# Patient Record
Sex: FEMALE | Race: ASIAN | NOT HISPANIC OR LATINO | URBAN - METROPOLITAN AREA
[De-identification: names, ages, dates, MRNs, and addresses within clinical notes are randomized per-mention and may not be internally consistent; named-entity substitution may affect disease eponyms.]

---

## 2019-11-02 ENCOUNTER — INPATIENT (INPATIENT)
Facility: HOSPITAL | Age: 74
LOS: 1 days | Discharge: HOME | End: 2019-11-04
Attending: HOSPITALIST | Admitting: HOSPITALIST
Payer: MEDICARE

## 2019-11-02 VITALS
SYSTOLIC BLOOD PRESSURE: 160 MMHG | DIASTOLIC BLOOD PRESSURE: 86 MMHG | RESPIRATION RATE: 16 BRPM | TEMPERATURE: 99 F | HEART RATE: 118 BPM | OXYGEN SATURATION: 98 %

## 2019-11-02 DIAGNOSIS — E78.5 HYPERLIPIDEMIA, UNSPECIFIED: ICD-10-CM

## 2019-11-02 DIAGNOSIS — Z02.9 ENCOUNTER FOR ADMINISTRATIVE EXAMINATIONS, UNSPECIFIED: ICD-10-CM

## 2019-11-02 DIAGNOSIS — J18.9 PNEUMONIA, UNSPECIFIED ORGANISM: ICD-10-CM

## 2019-11-02 DIAGNOSIS — M81.0 AGE-RELATED OSTEOPOROSIS WITHOUT CURRENT PATHOLOGICAL FRACTURE: ICD-10-CM

## 2019-11-02 DIAGNOSIS — Z29.9 ENCOUNTER FOR PROPHYLACTIC MEASURES, UNSPECIFIED: ICD-10-CM

## 2019-11-02 DIAGNOSIS — E87.1 HYPO-OSMOLALITY AND HYPONATREMIA: ICD-10-CM

## 2019-11-02 DIAGNOSIS — A41.9 SEPSIS, UNSPECIFIED ORGANISM: ICD-10-CM

## 2019-11-02 DIAGNOSIS — I10 ESSENTIAL (PRIMARY) HYPERTENSION: ICD-10-CM

## 2019-11-02 DIAGNOSIS — E11.65 TYPE 2 DIABETES MELLITUS WITH HYPERGLYCEMIA: ICD-10-CM

## 2019-11-02 LAB
ALBUMIN SERPL ELPH-MCNC: 3.9 G/DL — SIGNIFICANT CHANGE UP (ref 3.3–5)
ALP SERPL-CCNC: 58 U/L — SIGNIFICANT CHANGE UP (ref 40–120)
ALT FLD-CCNC: 16 U/L — SIGNIFICANT CHANGE UP (ref 4–33)
ANION GAP SERPL CALC-SCNC: 14 MMO/L — SIGNIFICANT CHANGE UP (ref 7–14)
APPEARANCE UR: CLEAR — SIGNIFICANT CHANGE UP
AST SERPL-CCNC: 19 U/L — SIGNIFICANT CHANGE UP (ref 4–32)
B PERT DNA SPEC QL NAA+PROBE: NOT DETECTED — SIGNIFICANT CHANGE UP
BACTERIA # UR AUTO: NEGATIVE — SIGNIFICANT CHANGE UP
BASE EXCESS BLDV CALC-SCNC: 0.4 MMOL/L — SIGNIFICANT CHANGE UP
BASE EXCESS BLDV CALC-SCNC: 1.2 MMOL/L — SIGNIFICANT CHANGE UP
BASOPHILS # BLD AUTO: 0.02 K/UL — SIGNIFICANT CHANGE UP (ref 0–0.2)
BASOPHILS NFR BLD AUTO: 0.2 % — SIGNIFICANT CHANGE UP (ref 0–2)
BILIRUB SERPL-MCNC: 0.4 MG/DL — SIGNIFICANT CHANGE UP (ref 0.2–1.2)
BILIRUB UR-MCNC: NEGATIVE — SIGNIFICANT CHANGE UP
BLOOD GAS VENOUS - CREATININE: 0.89 MG/DL — SIGNIFICANT CHANGE UP (ref 0.5–1.3)
BLOOD GAS VENOUS - CREATININE: 0.99 MG/DL — SIGNIFICANT CHANGE UP (ref 0.5–1.3)
BLOOD UR QL VISUAL: SIGNIFICANT CHANGE UP
BUN SERPL-MCNC: 21 MG/DL — SIGNIFICANT CHANGE UP (ref 7–23)
C PNEUM DNA SPEC QL NAA+PROBE: NOT DETECTED — SIGNIFICANT CHANGE UP
CALCIUM SERPL-MCNC: 9.6 MG/DL — SIGNIFICANT CHANGE UP (ref 8.4–10.5)
CHLORIDE BLDV-SCNC: 102 MMOL/L — SIGNIFICANT CHANGE UP (ref 96–108)
CHLORIDE BLDV-SCNC: 98 MMOL/L — SIGNIFICANT CHANGE UP (ref 96–108)
CHLORIDE SERPL-SCNC: 92 MMOL/L — LOW (ref 98–107)
CO2 SERPL-SCNC: 23 MMOL/L — SIGNIFICANT CHANGE UP (ref 22–31)
COLOR SPEC: YELLOW — SIGNIFICANT CHANGE UP
CREAT SERPL-MCNC: 1.03 MG/DL — SIGNIFICANT CHANGE UP (ref 0.5–1.3)
EOSINOPHIL # BLD AUTO: 0.22 K/UL — SIGNIFICANT CHANGE UP (ref 0–0.5)
EOSINOPHIL NFR BLD AUTO: 1.8 % — SIGNIFICANT CHANGE UP (ref 0–6)
FLUAV H1 2009 PAND RNA SPEC QL NAA+PROBE: NOT DETECTED — SIGNIFICANT CHANGE UP
FLUAV H1 RNA SPEC QL NAA+PROBE: NOT DETECTED — SIGNIFICANT CHANGE UP
FLUAV H3 RNA SPEC QL NAA+PROBE: NOT DETECTED — SIGNIFICANT CHANGE UP
FLUAV SUBTYP SPEC NAA+PROBE: NOT DETECTED — SIGNIFICANT CHANGE UP
FLUBV RNA SPEC QL NAA+PROBE: NOT DETECTED — SIGNIFICANT CHANGE UP
GAS PNL BLDV: 128 MMOL/L — LOW (ref 136–146)
GAS PNL BLDV: 129 MMOL/L — LOW (ref 136–146)
GLUCOSE BLDV-MCNC: 300 MG/DL — HIGH (ref 70–99)
GLUCOSE BLDV-MCNC: 317 MG/DL — HIGH (ref 70–99)
GLUCOSE SERPL-MCNC: 322 MG/DL — HIGH (ref 70–99)
GLUCOSE UR-MCNC: >1000 — HIGH
HADV DNA SPEC QL NAA+PROBE: NOT DETECTED — SIGNIFICANT CHANGE UP
HCO3 BLDV-SCNC: 24 MMOL/L — SIGNIFICANT CHANGE UP (ref 20–27)
HCO3 BLDV-SCNC: 25 MMOL/L — SIGNIFICANT CHANGE UP (ref 20–27)
HCOV PNL SPEC NAA+PROBE: SIGNIFICANT CHANGE UP
HCT VFR BLD CALC: 37 % — SIGNIFICANT CHANGE UP (ref 34.5–45)
HCT VFR BLDV CALC: 36.9 % — SIGNIFICANT CHANGE UP (ref 34.5–45)
HCT VFR BLDV CALC: 42.4 % — SIGNIFICANT CHANGE UP (ref 34.5–45)
HGB BLD-MCNC: 12.5 G/DL — SIGNIFICANT CHANGE UP (ref 11.5–15.5)
HGB BLDV-MCNC: 12 G/DL — SIGNIFICANT CHANGE UP (ref 11.5–15.5)
HGB BLDV-MCNC: 13.8 G/DL — SIGNIFICANT CHANGE UP (ref 11.5–15.5)
HMPV RNA SPEC QL NAA+PROBE: NOT DETECTED — SIGNIFICANT CHANGE UP
HPIV1 RNA SPEC QL NAA+PROBE: NOT DETECTED — SIGNIFICANT CHANGE UP
HPIV2 RNA SPEC QL NAA+PROBE: NOT DETECTED — SIGNIFICANT CHANGE UP
HPIV3 RNA SPEC QL NAA+PROBE: NOT DETECTED — SIGNIFICANT CHANGE UP
HPIV4 RNA SPEC QL NAA+PROBE: NOT DETECTED — SIGNIFICANT CHANGE UP
HYALINE CASTS # UR AUTO: NEGATIVE — SIGNIFICANT CHANGE UP
IMM GRANULOCYTES NFR BLD AUTO: 0.5 % — SIGNIFICANT CHANGE UP (ref 0–1.5)
KETONES UR-MCNC: SIGNIFICANT CHANGE UP
LACTATE BLDV-MCNC: 2.1 MMOL/L — HIGH (ref 0.5–2)
LACTATE BLDV-MCNC: 3.2 MMOL/L — HIGH (ref 0.5–2)
LEUKOCYTE ESTERASE UR-ACNC: SIGNIFICANT CHANGE UP
LYMPHOCYTES # BLD AUTO: 0.89 K/UL — LOW (ref 1–3.3)
LYMPHOCYTES # BLD AUTO: 7.4 % — LOW (ref 13–44)
MCHC RBC-ENTMCNC: 30.2 PG — SIGNIFICANT CHANGE UP (ref 27–34)
MCHC RBC-ENTMCNC: 33.8 % — SIGNIFICANT CHANGE UP (ref 32–36)
MCV RBC AUTO: 89.4 FL — SIGNIFICANT CHANGE UP (ref 80–100)
MONOCYTES # BLD AUTO: 0.39 K/UL — SIGNIFICANT CHANGE UP (ref 0–0.9)
MONOCYTES NFR BLD AUTO: 3.3 % — SIGNIFICANT CHANGE UP (ref 2–14)
NEUTROPHILS # BLD AUTO: 10.42 K/UL — HIGH (ref 1.8–7.4)
NEUTROPHILS NFR BLD AUTO: 86.8 % — HIGH (ref 43–77)
NITRITE UR-MCNC: NEGATIVE — SIGNIFICANT CHANGE UP
NRBC # FLD: 0 K/UL — SIGNIFICANT CHANGE UP (ref 0–0)
PCO2 BLDV: 39 MMHG — LOW (ref 41–51)
PCO2 BLDV: 42 MMHG — SIGNIFICANT CHANGE UP (ref 41–51)
PH BLDV: 7.39 PH — SIGNIFICANT CHANGE UP (ref 7.32–7.43)
PH BLDV: 7.43 PH — SIGNIFICANT CHANGE UP (ref 7.32–7.43)
PH UR: 6 — SIGNIFICANT CHANGE UP (ref 5–8)
PLATELET # BLD AUTO: 159 K/UL — SIGNIFICANT CHANGE UP (ref 150–400)
PMV BLD: 10.6 FL — SIGNIFICANT CHANGE UP (ref 7–13)
PO2 BLDV: 36 MMHG — SIGNIFICANT CHANGE UP (ref 35–40)
PO2 BLDV: 41 MMHG — HIGH (ref 35–40)
POTASSIUM BLDV-SCNC: 3.9 MMOL/L — SIGNIFICANT CHANGE UP (ref 3.4–4.5)
POTASSIUM BLDV-SCNC: 4.1 MMOL/L — SIGNIFICANT CHANGE UP (ref 3.4–4.5)
POTASSIUM SERPL-MCNC: 4.3 MMOL/L — SIGNIFICANT CHANGE UP (ref 3.5–5.3)
POTASSIUM SERPL-SCNC: 4.3 MMOL/L — SIGNIFICANT CHANGE UP (ref 3.5–5.3)
PROT SERPL-MCNC: 7.5 G/DL — SIGNIFICANT CHANGE UP (ref 6–8.3)
PROT UR-MCNC: 100 — HIGH
RBC # BLD: 4.14 M/UL — SIGNIFICANT CHANGE UP (ref 3.8–5.2)
RBC # FLD: 12.6 % — SIGNIFICANT CHANGE UP (ref 10.3–14.5)
RBC CASTS # UR COMP ASSIST: HIGH (ref 0–?)
RSV RNA SPEC QL NAA+PROBE: NOT DETECTED — SIGNIFICANT CHANGE UP
RV+EV RNA SPEC QL NAA+PROBE: NOT DETECTED — SIGNIFICANT CHANGE UP
SAO2 % BLDV: 64.8 % — SIGNIFICANT CHANGE UP (ref 60–85)
SAO2 % BLDV: 75.5 % — SIGNIFICANT CHANGE UP (ref 60–85)
SODIUM SERPL-SCNC: 129 MMOL/L — LOW (ref 135–145)
SP GR SPEC: 1.03 — SIGNIFICANT CHANGE UP (ref 1–1.04)
SQUAMOUS # UR AUTO: SIGNIFICANT CHANGE UP
TROPONIN T, HIGH SENSITIVITY: 22 NG/L — SIGNIFICANT CHANGE UP (ref ?–14)
TSH SERPL-MCNC: 1.72 UIU/ML — SIGNIFICANT CHANGE UP (ref 0.27–4.2)
UROBILINOGEN FLD QL: NORMAL — SIGNIFICANT CHANGE UP
WBC # BLD: 12 K/UL — HIGH (ref 3.8–10.5)
WBC # FLD AUTO: 12 K/UL — HIGH (ref 3.8–10.5)
WBC UR QL: HIGH (ref 0–?)

## 2019-11-02 PROCEDURE — 71045 X-RAY EXAM CHEST 1 VIEW: CPT | Mod: 26

## 2019-11-02 RX ORDER — CEFTRIAXONE 500 MG/1
1000 INJECTION, POWDER, FOR SOLUTION INTRAMUSCULAR; INTRAVENOUS EVERY 24 HOURS
Refills: 0 | Status: DISCONTINUED | OUTPATIENT
Start: 2019-11-03 | End: 2019-11-03

## 2019-11-02 RX ORDER — INSULIN LISPRO 100/ML
VIAL (ML) SUBCUTANEOUS AT BEDTIME
Refills: 0 | Status: DISCONTINUED | OUTPATIENT
Start: 2019-11-02 | End: 2019-11-04

## 2019-11-02 RX ORDER — SODIUM CHLORIDE 9 MG/ML
1000 INJECTION, SOLUTION INTRAVENOUS
Refills: 0 | Status: DISCONTINUED | OUTPATIENT
Start: 2019-11-02 | End: 2019-11-04

## 2019-11-02 RX ORDER — CHOLECALCIFEROL (VITAMIN D3) 125 MCG
1 CAPSULE ORAL
Qty: 0 | Refills: 0 | DISCHARGE

## 2019-11-02 RX ORDER — RALOXIFENE HYDROCHLORIDE 60 MG/1
60 TABLET, COATED ORAL DAILY
Refills: 0 | Status: DISCONTINUED | OUTPATIENT
Start: 2019-11-02 | End: 2019-11-04

## 2019-11-02 RX ORDER — CHOLECALCIFEROL (VITAMIN D3) 125 MCG
1000 CAPSULE ORAL DAILY
Refills: 0 | Status: DISCONTINUED | OUTPATIENT
Start: 2019-11-02 | End: 2019-11-04

## 2019-11-02 RX ORDER — GLUCAGON INJECTION, SOLUTION 0.5 MG/.1ML
1 INJECTION, SOLUTION SUBCUTANEOUS ONCE
Refills: 0 | Status: DISCONTINUED | OUTPATIENT
Start: 2019-11-02 | End: 2019-11-04

## 2019-11-02 RX ORDER — SODIUM CHLORIDE 9 MG/ML
1000 INJECTION INTRAMUSCULAR; INTRAVENOUS; SUBCUTANEOUS ONCE
Refills: 0 | Status: COMPLETED | OUTPATIENT
Start: 2019-11-02 | End: 2019-11-02

## 2019-11-02 RX ORDER — INSULIN LISPRO 100/ML
VIAL (ML) SUBCUTANEOUS
Refills: 0 | Status: DISCONTINUED | OUTPATIENT
Start: 2019-11-02 | End: 2019-11-04

## 2019-11-02 RX ORDER — AZITHROMYCIN 500 MG/1
500 TABLET, FILM COATED ORAL EVERY 24 HOURS
Refills: 0 | Status: DISCONTINUED | OUTPATIENT
Start: 2019-11-03 | End: 2019-11-03

## 2019-11-02 RX ORDER — SITAGLIPTIN AND METFORMIN HYDROCHLORIDE 500; 50 MG/1; MG/1
1 TABLET, FILM COATED ORAL
Qty: 0 | Refills: 0 | DISCHARGE

## 2019-11-02 RX ORDER — DEXTROSE 50 % IN WATER 50 %
25 SYRINGE (ML) INTRAVENOUS ONCE
Refills: 0 | Status: DISCONTINUED | OUTPATIENT
Start: 2019-11-02 | End: 2019-11-04

## 2019-11-02 RX ORDER — DEXTROSE 50 % IN WATER 50 %
12.5 SYRINGE (ML) INTRAVENOUS ONCE
Refills: 0 | Status: DISCONTINUED | OUTPATIENT
Start: 2019-11-02 | End: 2019-11-04

## 2019-11-02 RX ORDER — CEFTRIAXONE 500 MG/1
1000 INJECTION, POWDER, FOR SOLUTION INTRAMUSCULAR; INTRAVENOUS ONCE
Refills: 0 | Status: COMPLETED | OUTPATIENT
Start: 2019-11-02 | End: 2019-11-02

## 2019-11-02 RX ORDER — ASPIRIN/CALCIUM CARB/MAGNESIUM 324 MG
81 TABLET ORAL DAILY
Refills: 0 | Status: DISCONTINUED | OUTPATIENT
Start: 2019-11-02 | End: 2019-11-04

## 2019-11-02 RX ORDER — ATORVASTATIN CALCIUM 80 MG/1
40 TABLET, FILM COATED ORAL AT BEDTIME
Refills: 0 | Status: DISCONTINUED | OUTPATIENT
Start: 2019-11-02 | End: 2019-11-04

## 2019-11-02 RX ORDER — LEVOTHYROXINE SODIUM 125 MCG
1 TABLET ORAL
Qty: 0 | Refills: 0 | DISCHARGE

## 2019-11-02 RX ORDER — IPRATROPIUM/ALBUTEROL SULFATE 18-103MCG
3 AEROSOL WITH ADAPTER (GRAM) INHALATION ONCE
Refills: 0 | Status: COMPLETED | OUTPATIENT
Start: 2019-11-02 | End: 2019-11-02

## 2019-11-02 RX ORDER — LEVOTHYROXINE SODIUM 125 MCG
25 TABLET ORAL DAILY
Refills: 0 | Status: DISCONTINUED | OUTPATIENT
Start: 2019-11-02 | End: 2019-11-04

## 2019-11-02 RX ORDER — AZITHROMYCIN 500 MG/1
500 TABLET, FILM COATED ORAL ONCE
Refills: 0 | Status: COMPLETED | OUTPATIENT
Start: 2019-11-02 | End: 2019-11-02

## 2019-11-02 RX ORDER — ASPIRIN/CALCIUM CARB/MAGNESIUM 324 MG
1 TABLET ORAL
Qty: 0 | Refills: 0 | DISCHARGE

## 2019-11-02 RX ORDER — RALOXIFENE HYDROCHLORIDE 60 MG/1
1 TABLET, COATED ORAL
Qty: 0 | Refills: 0 | DISCHARGE

## 2019-11-02 RX ORDER — LOSARTAN POTASSIUM 100 MG/1
100 TABLET, FILM COATED ORAL DAILY
Refills: 0 | Status: DISCONTINUED | OUTPATIENT
Start: 2019-11-03 | End: 2019-11-04

## 2019-11-02 RX ORDER — SODIUM CHLORIDE 9 MG/ML
1000 INJECTION INTRAMUSCULAR; INTRAVENOUS; SUBCUTANEOUS
Refills: 0 | Status: DISCONTINUED | OUTPATIENT
Start: 2019-11-02 | End: 2019-11-04

## 2019-11-02 RX ORDER — DEXTROSE 50 % IN WATER 50 %
15 SYRINGE (ML) INTRAVENOUS ONCE
Refills: 0 | Status: DISCONTINUED | OUTPATIENT
Start: 2019-11-02 | End: 2019-11-04

## 2019-11-02 RX ADMIN — SODIUM CHLORIDE 75 MILLILITER(S): 9 INJECTION INTRAMUSCULAR; INTRAVENOUS; SUBCUTANEOUS at 23:41

## 2019-11-02 RX ADMIN — SODIUM CHLORIDE 1000 MILLILITER(S): 9 INJECTION INTRAMUSCULAR; INTRAVENOUS; SUBCUTANEOUS at 20:40

## 2019-11-02 RX ADMIN — CEFTRIAXONE 1000 MILLIGRAM(S): 500 INJECTION, POWDER, FOR SOLUTION INTRAMUSCULAR; INTRAVENOUS at 20:10

## 2019-11-02 RX ADMIN — Medication 3 MILLILITER(S): at 23:10

## 2019-11-02 RX ADMIN — SODIUM CHLORIDE 1000 MILLILITER(S): 9 INJECTION INTRAMUSCULAR; INTRAVENOUS; SUBCUTANEOUS at 19:23

## 2019-11-02 RX ADMIN — AZITHROMYCIN 255 MILLIGRAM(S): 500 TABLET, FILM COATED ORAL at 20:03

## 2019-11-02 RX ADMIN — AZITHROMYCIN 500 MILLIGRAM(S): 500 TABLET, FILM COATED ORAL at 21:05

## 2019-11-02 RX ADMIN — CEFTRIAXONE 100 MILLIGRAM(S): 500 INJECTION, POWDER, FOR SOLUTION INTRAMUSCULAR; INTRAVENOUS at 19:23

## 2019-11-02 NOTE — ED PROVIDER NOTE - CARE PLAN
Principal Discharge DX:	Pneumonia  Secondary Diagnosis:	Chest pain Principal Discharge DX:	Pneumonia  Secondary Diagnosis:	Chest pain  Secondary Diagnosis:	UTI (urinary tract infection)  Secondary Diagnosis:	Sepsis

## 2019-11-02 NOTE — H&P ADULT - PROBLEM SELECTOR PLAN 9
1.  Name of PCP: Margarita Mcgee   2.  PCP Contacted on Admission: [ x] Y    [ ] N    3.  PCP contacted at Discharge: [ ] Y    [ ] N    [ ] N/A  4.  Post-Discharge Appointment Date and Location:  5.  Summary of Handoff given to PCP:]    Latosha Christian PGY-3  Internal medicine Night Admit  Pager [phone] 1.  Name of PCP: Margarita Mcgee   2.  PCP Contacted on Admission: [ ] Y    [ x ] N  admitted overnight  3.  PCP contacted at Discharge: [ ] Y    [ ] N    [ ] N/A  4.  Post-Discharge Appointment Date and Location:  5.  Summary of Handoff given to PCP:]    Latosha Christian PGY-3  Internal medicine Night Admit  Pager [phone]

## 2019-11-02 NOTE — H&P ADULT - PROBLEM SELECTOR PLAN 4
- T2DM not on home insulin, unclear recent A1C  - Holding home diabetic medication including Glyburide, Metformin and Sitagliptin   - Continue with ISS for now, goal -180 while in the hospital   - Check A1C  - Patient is of advanced age, would benefit from discontinuation of Sulfonylurea to prevent hypoglycemia at discharge

## 2019-11-02 NOTE — H&P ADULT - PROBLEM SELECTOR PLAN 8
- DVT prophylaxis: IMPROVE score 1 (age), Lovenox subQ for prophylaxis  - GI prophylaxis: not indicated  - Diet: DASH/CC diet - DVT prophylaxis: IMPROVE score 1 (age), heparin subQ  - GI prophylaxis: not indicated  - Diet: DASH/CC diet

## 2019-11-02 NOTE — H&P ADULT - PROBLEM SELECTOR PLAN 1
- Patient presented with fever, tachycardia, leukocytosis and tachypnea meeting all SIRS criteria likely source being pneumonia based on clinical symptoms   - Lactate improved with IVF from 3.2--> 2.1  - Continue empirically with Ceftriaxone and Azithromycin for treatment for CAP while pending blood cultures x2 and urine culture   - Continue with gentle IVF hydration

## 2019-11-02 NOTE — H&P ADULT - PROBLEM SELECTOR PLAN 5
-160/80-90  - Holding home HCTZ in the setting of hyponatremia  - Continue with Losartan 100 mg with holding parameters as home equivalent of Olmesartan  - Monitor BP

## 2019-11-02 NOTE — ED PROVIDER NOTE - ATTENDING CONTRIBUTION TO CARE
I performed a face to face history and physical exam of the patient and discussed their management with the resident. I reviewed the resident's note and agree with the documented findings and plan of care. 74 y/o female with DM, HTN, hypothyroid, p/w weakness, decrease PO intake, fever, cough, pleuritic chest pain x 3 days, progressively worsening today with 3 episodes of vomiting, 2 episodes of nonbloody diarrhea yesterday, pt just finished 1 week dose of nitrofurantoin for UTI, mild periumbilical pain, no dysuria, no CVAT, no rash, no sick contacts, on exam pt with significant pleuritic chest pain, lungs CTA, abd soft nt/nd, axox3, 1)CBC, CMP, Blood cx x 2, u/a, urine culture, vbg 2)cxr, EKG 3)IVF 4)IV antibiotics 5)admit I performed a face to face history and physical exam of the patient and discussed their management with the PA. I reviewed the PA's note and agree with the documented findings and plan of care. 72 y/o female with DM, HTN, hypothyroid, p/w weakness, decrease PO intake, fever, cough, pleuritic chest pain x 3 days, progressively worsening today with 3 episodes of vomiting, 2 episodes of nonbloody diarrhea yesterday, pt just finished 1 week dose of nitrofurantoin for UTI, mild periumbilical pain, no dysuria, no CVAT, no rash, no sick contacts, on exam pt with significant pleuritic chest pain, lungs CTA, abd soft nt/nd, axox3, 1)CBC, CMP, Blood cx x 2, u/a, urine culture, vbg 2)cxr, EKG 3)IVF 4)IV antibiotics 5)admit

## 2019-11-02 NOTE — ED PROVIDER NOTE - PMH
Essential hypertension    Hammertoe, right  3rd toe  Hypothyroidism    Osteoporosis    Type 2 diabetes mellitus without complication

## 2019-11-02 NOTE — ED ADULT TRIAGE NOTE - CHIEF COMPLAINT QUOTE
Pt c/o weakness, dry  cough ,  intermittent chest pain, dizziness, high blood sugar, N/V/D, poor appetite , and  fever since Thursday, worse today.  pt took tylenol 1g at 5pm

## 2019-11-02 NOTE — H&P ADULT - NSHPLABSRESULTS_GEN_ALL_CORE
LABS:                        12.5   12.00 )-----------( 159      ( 2019 18:40 )             37.0     Hgb Trend: 12.5<--  11-02    129<L>  |  92<L>  |  21  ----------------------------<  322<H>  4.3   |  23  |  1.03    Ca    9.6      2019 18:40    TPro  7.5  /  Alb  3.9  /  TBili  0.4  /  DBili  x   /  AST  19  /  ALT  16  /  AlkPhos  58      Creatinine Trend: 1.03<--    Urinalysis Basic - ( 2019 18:40 )    Color: YELLOW / Appearance: CLEAR / S.030 / pH: 6.0  Gluc: >1000 / Ketone: SMALL  / Bili: NEGATIVE / Urobili: NORMAL   Blood: SMALL / Protein: 100 / Nitrite: NEGATIVE   Leuk Esterase: SMALL / RBC: 6-10 / WBC 11-25   Sq Epi: FEW / Non Sq Epi: x / Bacteria: NEGATIVE        Venous Blood Gas:   @ 20:46  7.39/42/36/24/64.8  VBG Lactate: 2.1  Venous Blood Gas:   @ 18:40  7.43/39/41/25/75.5  VBG Lactate: 3.2    CXR reviewed which did no reveal any focal consolidation, no pleural effusion LABS:                        12.5   12.00 )-----------( 159      ( 2019 18:40 )             37.0     Hgb Trend: 12.5<--  11-02    129<L>  |  92<L>  |  21  ----------------------------<  322<H>  4.3   |  23  |  1.03    Ca    9.6      2019 18:40    TPro  7.5  /  Alb  3.9  /  TBili  0.4  /  DBili  x   /  AST  19  /  ALT  16  /  AlkPhos  58      Creatinine Trend: 1.03<--    Urinalysis Basic - ( 2019 18:40 )    Color: YELLOW / Appearance: CLEAR / S.030 / pH: 6.0  Gluc: >1000 / Ketone: SMALL  / Bili: NEGATIVE / Urobili: NORMAL   Blood: SMALL / Protein: 100 / Nitrite: NEGATIVE   Leuk Esterase: SMALL / RBC: 6-10 / WBC 11-25   Sq Epi: FEW / Non Sq Epi: x / Bacteria: NEGATIVE        Venous Blood Gas:   @ 20:46  7.39/42/36/24/64.8  VBG Lactate: 2.1  Venous Blood Gas:   @ 18:40  7.43/39/41/25/75.5  VBG Lactate: 3.2    CXR reviewed which did no reveal any focal consolidation, no pleural effusion    EKG reviewed which showed sinus tachycardia of HR, no ST elevation or depression LABS:                        12.5   12.00 )-----------( 159      ( 2019 18:40 )             37.0     11-    129<L>  |  92<L>  |  21  ----------------------------<  322<H>  4.3   |  23  |  1.03    Ca    9.6      2019 18:40    TPro  7.5  /  Alb  3.9  /  TBili  0.4  /  DBili  x   /  AST  19  /  ALT  16  /  AlkPhos  58      Urinalysis Basic - ( 2019 18:40 )  Color: YELLOW / Appearance: CLEAR / S.030 / pH: 6.0  Gluc: >1000 / Ketone: SMALL  / Bili: NEGATIVE / Urobili: NORMAL   Blood: SMALL / Protein: 100 / Nitrite: NEGATIVE   Leuk Esterase: SMALL / RBC: 6-10 / WBC 11-25   Sq Epi: FEW / Non Sq Epi: x / Bacteria: NEGATIVE    Venous Blood Gas:   @ 20:46  7.39/42/36/24/64.8  VBG Lactate: 2.1  Venous Blood Gas:   @ 18:40  7.43/39/41/25/75.5  VBG Lactate: 3.2    CXR personally reviewed which did no reveal any focal consolidation, no pleural effusion    CT personally reviewed - ?RLL consolidations    EKG personally reviewed which showed sinus tachycardia of HR, no ST elevation or depression; QTc 383ms LABS:                        12.5   12.00 )-----------( 159      ( 2019 18:40 )             37.0     11-    129<L>  |  92<L>  |  21  ----------------------------<  322<H>  4.3   |  23  |  1.03    Ca    9.6      2019 18:40    TPro  7.5  /  Alb  3.9  /  TBili  0.4  /  DBili  x   /  AST  19  /  ALT  16  /  AlkPhos  58      Urinalysis Basic - ( 2019 18:40 )  Color: YELLOW / Appearance: CLEAR / S.030 / pH: 6.0  Gluc: >1000 / Ketone: SMALL  / Bili: NEGATIVE / Urobili: NORMAL   Blood: SMALL / Protein: 100 / Nitrite: NEGATIVE   Leuk Esterase: SMALL / RBC: 6-10 / WBC 11-25   Sq Epi: FEW / Non Sq Epi: x / Bacteria: NEGATIVE    Venous Blood Gas:   @ 20:46  7.39/42/36/24/64.8  VBG Lactate: 2.1  Venous Blood Gas:   @ 18:40  7.43/39/41/25/75.5  VBG Lactate: 3.2    CXR personally reviewed which did no reveal any focal consolidation, no pleural effusion    CT personally reviewed - ?RLL consolidation    EKG personally reviewed which showed sinus tachycardia of HR, no ST elevation or depression; QTc 383ms

## 2019-11-02 NOTE — H&P ADULT - NSHPREVIEWOFSYSTEMS_GEN_ALL_CORE
Constitutional: Subjective fever and chills.   HEENT: Positive for nasal congestion.  CV: No chest pain, or palpitations. No orthopnea.   Resp: Dry cough with no sputum production. Wheezing.   GI: No nausea or vomiting. No diarrhea or constipation. No abdominal pain.   : No dysuria, no nocturia or increased urinary frequency.  Musculoskeletal: No back pain   Skin: No rash    Neurological: No headache    Psychiatric: Denies depressed mood.   Endocrine: No cold or heat intolerance. No dry skin.  Hematologic/Lymphatic: No anemia or bleeding problem. Constitutional: Subjective fever and chills.   HEENT: Positive for nasal congestion.  CV: No chest pain, or palpitations. No orthopnea.   Resp: Dry cough with no sputum production. Wheezing.   GI: No nausea or vomiting. No diarrhea or constipation. No abdominal pain.   : No dysuria, no nocturia or increased urinary frequency.  Musculoskeletal: No back pain; ambulates with cane; no recent falls  Skin: No rash    Neurological: No headache    Psychiatric: Denies depressed mood.   Endocrine: No cold or heat intolerance. No dry skin.  Hematologic/Lymphatic: No anemia or bleeding problem.

## 2019-11-02 NOTE — H&P ADULT - PROBLEM SELECTOR PLAN 6
- Patient reports not being adherent to cholesterol medication at home  - Continue with Rosuvastatin 10 mg   - Check lipid panel in the am

## 2019-11-02 NOTE — H&P ADULT - ASSESSMENT
Patient is a 73 year old woman with history of HTN, T2DM, HLD, osteoporosis presented with nonproductive cough for 3 days, admitted for sepsis secondary to pneumonia. Patient is a 73 year old woman with history of HTN, T2DM, HLD, osteoporosis presented with nonproductive cough for 3 days, admitted for sepsis secondary to presumed pneumonia.

## 2019-11-02 NOTE — H&P ADULT - NSICDXFAMILYHX_GEN_ALL_CORE_FT
FAMILY HISTORY:  Family history of diabetes mellitus, mother and father  Family history of hypertension, mother and father    Sibling  Still living? Yes, Estimated age: Age Unknown  Family history of diabetes mellitus, Age at diagnosis: Age Unknown

## 2019-11-02 NOTE — ED PROVIDER NOTE - OBJECTIVE STATEMENT
77 Y/O F PMH DM II , hypothyroid c/o cough since Thursday worse with coughing/turning/pressing the area. Pt denies sick contacts, lives with family and is very independent. Cough is currently non-productive. Pt denies any other acute sx or complaints.

## 2019-11-02 NOTE — H&P ADULT - NSICDXPASTMEDICALHX_GEN_ALL_CORE_FT
PAST MEDICAL HISTORY:  Essential hypertension     Hammertoe, right 3rd toe    Hypothyroidism     Osteoporosis     Type 2 diabetes mellitus without complication

## 2019-11-02 NOTE — ED PROVIDER NOTE - FAMILY HISTORY
Father  Still living? Unknown  Family history of hypertension, Age at diagnosis: Age Unknown  Family history of diabetes mellitus, Age at diagnosis: Age Unknown     Mother  Still living? No  Family history of hypertension, Age at diagnosis: Age Unknown  Family history of diabetes mellitus, Age at diagnosis: Age Unknown     Sibling  Still living? Yes, Estimated age: Age Unknown  Family history of diabetes mellitus, Age at diagnosis: Age Unknown

## 2019-11-02 NOTE — H&P ADULT - HISTORY OF PRESENT ILLNESS
Patient is a 73 year old woman with history of HTN, T2DM, HLD, osteoporosis, presented with cough since Thursday.     Patient describes the cough as nonproductive. She also endorses subjective fever and chills at home. She endorses sore throat. She denies nausea or vomit, but did have 3 episodes of nonbloody diarrhea at home on Thursday that has now stopped. She denies shortness of breath or chest pain. She denies recent travel, denies sick contact home. She lives with her daughter. Patient denies history of smoking, no exposure to second hand smoke.     Of note, patient was recently treated with 7 days of Nitrofurantoin for UTI, last dose was this morning. She denies dysuria or increasing urinary frequency.     In the ED, initial vitals were Tmax 39.1, -118, -160/80-90, RR 16-25, O2 saturation >95% on room air. Patient received 1XCeftriaxone, Azithromycin and 1 L NS bolus in the ED. Patient is a 73 year old woman with history of HTN, T2DM, HLD, hypothyroid, osteoporosis, presented with cough since Thursday.     Patient describes the cough as nonproductive. She also endorses subjective fever and chills at home. She endorses sore throat. She denies nausea or vomit, but did have 3 episodes of nonbloody diarrhea at home on Thursday that has now stopped. She denies shortness of breath or chest pain. She denies recent travel, denies sick contact home. She lives with her daughter. Patient denies history of smoking, no exposure to second hand smoke.     Of note, patient was recently treated with 7 days of Nitrofurantoin for UTI, last dose was this morning. She denies dysuria or increasing urinary frequency.     In the ED, initial vitals were Tmax 39.1, -118, -160/80-90, RR 16-25, O2 saturation >95% on room air. Patient received 1XCeftriaxone, Azithromycin and 1 L NS bolus in the ED. Patient is a 73 year old woman with history of HTN, T2DM, HLD, hypothyroid, osteoporosis, presented with cough since Thursday.     Patient describes the cough as nonproductive. She also endorses subjective fever and chills at home. She endorses sore throat. She denies nausea or vomit, but did have 3 episodes of nonbloody diarrhea at home on Thursday that has now stopped. She denies shortness of breath or chest pain. She denies recent travel, denies sick contact home. She lives with her daughter. Patient denies history of smoking, no exposure to second hand smoke.     Of note, patient was recently treated with 7 days of Nitrofurantoin for UTI, last dose was this morning. She denies dysuria or increasing urinary frequency (either prior to or after treatment with antibiotics).    In the ED, initial vitals were Tmax 39.1, -118, -160/80-90, RR 16-25, O2 saturation >95% on room air. Patient received 1XCeftriaxone, Azithromycin and 1 L NS bolus in the ED.

## 2019-11-02 NOTE — H&P ADULT - PROBLEM SELECTOR PLAN 7
- Continue with vitamin D and multivitamin for supplement  - Continue with home Raloxifene 60 mg QD  - Fall precaution

## 2019-11-02 NOTE — H&P ADULT - PROBLEM SELECTOR PLAN 3
- Sodium on admission 129, corrected for glucose 133  - Hyponatremia could be in the setting of use of HCTZ vs sepsis vs recent episodes of GI losses  - No mental status change, no seizure  - Ordered serum Osm, urine Na, urine Osm, cortisol for the am   - Continue with IVF hydration with NS

## 2019-11-02 NOTE — H&P ADULT - PROBLEM SELECTOR PLAN 2
- Patient presented with cough, fever although with no focal consolidation on CXR, clinically fitting the picture of pneumonia  - No recent hospitalization, not immunocompromised, no history of COPD  - Continue with Ceftriaxone and Azithromycin for treatment for CAP  - Ordered urine legionella due to recent episode of GI symptoms as well as hyponatremia - Patient presented with cough, fever although with no focal consolidation on CXR, clinically fitting the picture of pneumonia  - No recent hospitalization, not immunocompromised, no history of COPD  - Continue with Ceftriaxone and Azithromycin for treatment for CAP  - Ordered urine legionella due to recent episode of GI symptoms as well as hyponatremia  - Ordered CT chest non contrast for further evaluation - Patient presented with cough, fever although with no focal consolidation on CXR, clinically fitting the picture of pneumonia  - No recent hospitalization, not immunocompromised, no history of COPD  - Continue with Ceftriaxone and Azithromycin for treatment for CAP  - Ordered urine legionella due to recent episode of GI symptoms as well as hyponatremia  - Ordered CT chest non contrast for further evaluation  - patient without urinary symptoms prior to or after Macrobid

## 2019-11-02 NOTE — ED ADULT NURSE NOTE - OBJECTIVE STATEMENT
Pt received to room 11. Pt comes to ED c/o cough, getting worse since Thursday. Endorses feeling chest pain when coughing, turning, and palpating chest. Non-productive cough noted. Reports having 2 episodes of diarrhea and 2 episodes of vomiting today. Pt was recently tx with Macrobid for UTI, denies dysuria, hematuria and other urinary sx at this time. Respirations are even & unlabored, crackles noted to right lung, heart sounds normal, abd is soft, non-distended. Pt denies dyspnea, N/V/D, dizziness, headache, palpitations, changes in vision. Family at bedside, endorses pt is "very independently at home." 20 G IV placed to left AC. Pt is A&Ox4, ambulates independently.

## 2019-11-03 LAB
ANION GAP SERPL CALC-SCNC: 13 MMO/L — SIGNIFICANT CHANGE UP (ref 7–14)
BASOPHILS # BLD AUTO: 0.01 K/UL — SIGNIFICANT CHANGE UP (ref 0–0.2)
BASOPHILS NFR BLD AUTO: 0.1 % — SIGNIFICANT CHANGE UP (ref 0–2)
BUN SERPL-MCNC: 12 MG/DL — SIGNIFICANT CHANGE UP (ref 7–23)
CALCIUM SERPL-MCNC: 8.8 MG/DL — SIGNIFICANT CHANGE UP (ref 8.4–10.5)
CHLORIDE SERPL-SCNC: 94 MMOL/L — LOW (ref 98–107)
CHOLEST SERPL-MCNC: 126 MG/DL — SIGNIFICANT CHANGE UP (ref 120–199)
CO2 SERPL-SCNC: 23 MMOL/L — SIGNIFICANT CHANGE UP (ref 22–31)
CORTIS SERPL-MCNC: 16.2 UG/DL — SIGNIFICANT CHANGE UP (ref 2.7–18.4)
CREAT SERPL-MCNC: 0.75 MG/DL — SIGNIFICANT CHANGE UP (ref 0.5–1.3)
EOSINOPHIL # BLD AUTO: 0.34 K/UL — SIGNIFICANT CHANGE UP (ref 0–0.5)
EOSINOPHIL NFR BLD AUTO: 3.8 % — SIGNIFICANT CHANGE UP (ref 0–6)
GLUCOSE SERPL-MCNC: 265 MG/DL — HIGH (ref 70–99)
HBA1C BLD-MCNC: 7.8 % — HIGH (ref 4–5.6)
HCT VFR BLD CALC: 33.1 % — LOW (ref 34.5–45)
HCV AB S/CO SERPL IA: 0.18 S/CO — SIGNIFICANT CHANGE UP (ref 0–0.99)
HCV AB SERPL-IMP: SIGNIFICANT CHANGE UP
HDLC SERPL-MCNC: 46 MG/DL — SIGNIFICANT CHANGE UP (ref 45–65)
HGB BLD-MCNC: 11.4 G/DL — LOW (ref 11.5–15.5)
IMM GRANULOCYTES NFR BLD AUTO: 0.3 % — SIGNIFICANT CHANGE UP (ref 0–1.5)
LIDOCAIN IGE QN: 22.3 U/L — SIGNIFICANT CHANGE UP (ref 7–60)
LIPID PNL WITH DIRECT LDL SERPL: 65 MG/DL — SIGNIFICANT CHANGE UP
LYMPHOCYTES # BLD AUTO: 1.89 K/UL — SIGNIFICANT CHANGE UP (ref 1–3.3)
LYMPHOCYTES # BLD AUTO: 21.1 % — SIGNIFICANT CHANGE UP (ref 13–44)
MAGNESIUM SERPL-MCNC: 1.5 MG/DL — LOW (ref 1.6–2.6)
MCHC RBC-ENTMCNC: 30.7 PG — SIGNIFICANT CHANGE UP (ref 27–34)
MCHC RBC-ENTMCNC: 34.4 % — SIGNIFICANT CHANGE UP (ref 32–36)
MCV RBC AUTO: 89.2 FL — SIGNIFICANT CHANGE UP (ref 80–100)
MONOCYTES # BLD AUTO: 0.61 K/UL — SIGNIFICANT CHANGE UP (ref 0–0.9)
MONOCYTES NFR BLD AUTO: 6.8 % — SIGNIFICANT CHANGE UP (ref 2–14)
NEUTROPHILS # BLD AUTO: 6.09 K/UL — SIGNIFICANT CHANGE UP (ref 1.8–7.4)
NEUTROPHILS NFR BLD AUTO: 67.9 % — SIGNIFICANT CHANGE UP (ref 43–77)
NRBC # FLD: 0 K/UL — SIGNIFICANT CHANGE UP (ref 0–0)
OSMOLALITY SERPL: 285 MOSMO/KG — SIGNIFICANT CHANGE UP (ref 275–295)
OSMOLALITY UR: 382 MOSMO/KG — SIGNIFICANT CHANGE UP (ref 50–1200)
OSMOLALITY UR: 386 MOSMO/KG — SIGNIFICANT CHANGE UP (ref 50–1200)
PHOSPHATE SERPL-MCNC: 1.4 MG/DL — LOW (ref 2.5–4.5)
PLATELET # BLD AUTO: 149 K/UL — LOW (ref 150–400)
PMV BLD: 10.5 FL — SIGNIFICANT CHANGE UP (ref 7–13)
POTASSIUM SERPL-MCNC: 4.1 MMOL/L — SIGNIFICANT CHANGE UP (ref 3.5–5.3)
POTASSIUM SERPL-SCNC: 4.1 MMOL/L — SIGNIFICANT CHANGE UP (ref 3.5–5.3)
RBC # BLD: 3.71 M/UL — LOW (ref 3.8–5.2)
RBC # FLD: 12.7 % — SIGNIFICANT CHANGE UP (ref 10.3–14.5)
SODIUM SERPL-SCNC: 130 MMOL/L — LOW (ref 135–145)
SODIUM UR-SCNC: 104 MMOL/L — SIGNIFICANT CHANGE UP
SPECIMEN SOURCE: SIGNIFICANT CHANGE UP
SPECIMEN SOURCE: SIGNIFICANT CHANGE UP
TRIGL SERPL-MCNC: 123 MG/DL — SIGNIFICANT CHANGE UP (ref 10–149)
TROPONIN T, HIGH SENSITIVITY: 22 NG/L — SIGNIFICANT CHANGE UP (ref ?–14)
URATE UR-MCNC: 37.9 MG/DL — SIGNIFICANT CHANGE UP
WBC # BLD: 8.97 K/UL — SIGNIFICANT CHANGE UP (ref 3.8–10.5)
WBC # FLD AUTO: 8.97 K/UL — SIGNIFICANT CHANGE UP (ref 3.8–10.5)

## 2019-11-03 PROCEDURE — 74177 CT ABD & PELVIS W/CONTRAST: CPT | Mod: 26

## 2019-11-03 PROCEDURE — 99223 1ST HOSP IP/OBS HIGH 75: CPT | Mod: GC,AI

## 2019-11-03 PROCEDURE — 71250 CT THORAX DX C-: CPT | Mod: 26

## 2019-11-03 PROCEDURE — 12345: CPT | Mod: NC

## 2019-11-03 RX ORDER — MAGNESIUM SULFATE 500 MG/ML
1 VIAL (ML) INJECTION ONCE
Refills: 0 | Status: COMPLETED | OUTPATIENT
Start: 2019-11-03 | End: 2019-11-03

## 2019-11-03 RX ORDER — ACETAMINOPHEN 500 MG
650 TABLET ORAL ONCE
Refills: 0 | Status: DISCONTINUED | OUTPATIENT
Start: 2019-11-03 | End: 2019-11-03

## 2019-11-03 RX ORDER — IPRATROPIUM/ALBUTEROL SULFATE 18-103MCG
3 AEROSOL WITH ADAPTER (GRAM) INHALATION EVERY 6 HOURS
Refills: 0 | Status: DISCONTINUED | OUTPATIENT
Start: 2019-11-03 | End: 2019-11-04

## 2019-11-03 RX ORDER — HEPARIN SODIUM 5000 [USP'U]/ML
5000 INJECTION INTRAVENOUS; SUBCUTANEOUS EVERY 12 HOURS
Refills: 0 | Status: DISCONTINUED | OUTPATIENT
Start: 2019-11-03 | End: 2019-11-04

## 2019-11-03 RX ADMIN — Medication 1: at 21:57

## 2019-11-03 RX ADMIN — Medication 3: at 17:26

## 2019-11-03 RX ADMIN — Medication 1 TABLET(S): at 11:18

## 2019-11-03 RX ADMIN — Medication 1000 UNIT(S): at 17:26

## 2019-11-03 RX ADMIN — Medication 63.75 MILLIMOLE(S): at 14:52

## 2019-11-03 RX ADMIN — RALOXIFENE HYDROCHLORIDE 60 MILLIGRAM(S): 60 TABLET, COATED ORAL at 11:18

## 2019-11-03 RX ADMIN — LOSARTAN POTASSIUM 100 MILLIGRAM(S): 100 TABLET, FILM COATED ORAL at 05:48

## 2019-11-03 RX ADMIN — Medication 100 GRAM(S): at 11:20

## 2019-11-03 RX ADMIN — Medication 5: at 13:42

## 2019-11-03 RX ADMIN — Medication 3 MILLILITER(S): at 20:20

## 2019-11-03 RX ADMIN — Medication 25 MICROGRAM(S): at 05:48

## 2019-11-03 RX ADMIN — Medication 81 MILLIGRAM(S): at 11:19

## 2019-11-03 RX ADMIN — ATORVASTATIN CALCIUM 40 MILLIGRAM(S): 80 TABLET, FILM COATED ORAL at 21:57

## 2019-11-03 RX ADMIN — HEPARIN SODIUM 5000 UNIT(S): 5000 INJECTION INTRAVENOUS; SUBCUTANEOUS at 05:51

## 2019-11-03 RX ADMIN — Medication 2: at 09:40

## 2019-11-03 NOTE — DISCHARGE NOTE PROVIDER - HOSPITAL COURSE
73 F with history of HTN, T2DM, HLD, osteoporosis presented with nonproductive cough for 3 days, admitted for sepsis d/t PNA. CT chest showed__    RVP negative. Pt has been treated with OV ABx. BC__    Urine cx___ 73 F with history of HTN, T2DM, HLD, osteoporosis presented with nonproductive cough for 3 days, admitted for sepsis d/t PNA. CT chest showed-  No Pneumonia.      RVP negative. Pt has been treated with OV ABx. BC_neg    Urine cx__neg;      Dx- viral Pneumonia    Sepsis.  Plan: - Patient presented with fever, tachycardia, leukocytosis and tachypnea meeting all SIRS criteria likely source being pneumonia based on clinical symptoms     - Lactate improved with IVF from 3.2--> 2.1    - CT and CXR without PNA, patient reports supraumbilical abdominal pain radiating to back, will obtaon CT a/p with IV contrast and add on serum lipase; Hgb  slightly lower than yesterday but all cell lines declinedHyponatremia.  Plan: - Corrects to mild hypoNa after accounting for glucose    - Hyponatremia could be in the setting of use of HCTZ     - Continue with IVF hydration with NS    - hold thiazide.     Patient is hemodynamically stable and without complaints and patient is ready for discharge.  Case discussed and medications reviewed with patient and PMD.  Agreed with above.  Medications needed sent to pharmacy. 72 yo F with history of HTN, T2DM, HLD, osteoporosis presented with nonproductive cough for 3 days, admitted for sepsis d/t PNA based on clinical symptoms. CXR and CT chest was negative. RVP and blood cultures were negative. urine legionella was negative. patient clinically improved and was stable for discharge with otpt followup.             Dx- viral Pneumonia        Sepsis: Patient presented with fever, tachycardia, leukocytosis and tachypnea meeting all SIRS criteria, with a likely source being pneumonia based on clinical symptoms     - Lactate improved with IVF from 3.2--> 2.1    - CT and CXR without radiographic evidence of bacterial PNA, thus suspicion is for viral PNA.     - patient also reported supraumbilical abdominal pain radiating to back, however CT a/p with IV contrast was negative for acute intraabdominal pathology, did not meet criteria for pancreatitis        Hyponatremia:  - Corrects to mild hypoNa after accounting for glucose, could be in the setting of use of HCTZ     - Received IVF hydration with NS    - held thiazide.         Patient is hemodynamically stable and without complaints and patient is ready for discharge.  Case discussed and medications reviewed with patient and PMD.  Agreed with above.  Medications needed sent to pharmacy. Patient will followup with her PMD.

## 2019-11-03 NOTE — DISCHARGE NOTE PROVIDER - NSDCMRMEDTOKEN_GEN_ALL_CORE_FT
aspirin 81 mg oral tablet, chewable: 1 tab(s) orally once a day  Benicar HCT 40 mg-12.5 mg oral tablet: 1 tab(s) orally once a day  glyBURIDE 5 mg oral tablet: 1 tab(s) orally 2 times a day  Janumet 50 mg-1000 mg oral tablet: 1 tab(s) orally 2 times a day  Multiple Vitamins oral tablet: 1 tab(s) orally once a day  raloxifene 60 mg oral tablet: 1 tab(s) orally once a day  rosuvastatin 10 mg oral tablet: 1 tab(s) orally once a day     (Patient was prescribed, but has not been taking it)  Synthroid 25 mcg (0.025 mg) oral tablet: 1 tab(s) orally once a day  Vitamin D3 1000 intl units oral tablet: 1 tab(s) orally once a day aspirin 81 mg oral tablet, chewable: 1 tab(s) orally once a day  Janumet 50 mg-1000 mg oral tablet: 1 tab(s) orally 2 times a day  losartan 100 mg oral tablet: 1 tab(s) orally once a day  Multiple Vitamins oral tablet: 1 tab(s) orally once a day  raloxifene 60 mg oral tablet: 1 tab(s) orally once a day  rosuvastatin 10 mg oral tablet: 1 tab(s) orally once a day  Synthroid 25 mcg (0.025 mg) oral tablet: 1 tab(s) orally once a day  Vitamin D3 1000 intl units oral tablet: 1 tab(s) orally once a day

## 2019-11-03 NOTE — PROGRESS NOTE ADULT - SUBJECTIVE AND OBJECTIVE BOX
Patient is a 73y old  Female who presents with a chief complaint of Cough (2019 07:37)    SUBJECTIVE / OVERNIGHT EVENTS:    No events overnight. This AM, patient states she feels the same. Reports abdominal pain above her belly button, which goes to her back. No f/c/n/v/d/cp/sob.    MEDICATIONS  (STANDING):  aspirin  chewable 81 milliGRAM(s) Oral daily  atorvastatin 40 milliGRAM(s) Oral at bedtime  azithromycin  IVPB 500 milliGRAM(s) IV Intermittent every 24 hours  cefTRIAXone   IVPB 1000 milliGRAM(s) IV Intermittent every 24 hours  cholecalciferol 1000 Unit(s) Oral daily  dextrose 5%. 1000 milliLiter(s) (50 mL/Hr) IV Continuous <Continuous>  dextrose 50% Injectable 12.5 Gram(s) IV Push once  dextrose 50% Injectable 25 Gram(s) IV Push once  dextrose 50% Injectable 25 Gram(s) IV Push once  heparin  Injectable 5000 Unit(s) SubCutaneous every 12 hours  insulin lispro (HumaLOG) corrective regimen sliding scale   SubCutaneous three times a day before meals  insulin lispro (HumaLOG) corrective regimen sliding scale   SubCutaneous at bedtime  levothyroxine 25 MICROGram(s) Oral daily  losartan 100 milliGRAM(s) Oral daily  multivitamin 1 Tablet(s) Oral daily  raloxifene 60 milliGRAM(s) Oral daily  sodium chloride 0.9%. 1000 milliLiter(s) (75 mL/Hr) IV Continuous <Continuous>  sodium phosphate IVPB 15 milliMole(s) IV Intermittent once    MEDICATIONS  (PRN):  dextrose 40% Gel 15 Gram(s) Oral once PRN Blood Glucose LESS THAN 70 milliGRAM(s)/deciliter  glucagon  Injectable 1 milliGRAM(s) IntraMuscular once PRN Glucose LESS THAN 70 milligrams/deciliter      PHYSICAL EXAM:  T(C): 36.9 (19 @ 09:44), Max: 39.1 (19 @ 18:37)  HR: 100 (19 @ 09:44) (97 - 118)  BP: 134/78 (19 @ 09:44) (127/62 - 160/86)  RR: 18 (19 @ 09:44) (16 - 25)  SpO2: 98% (19 @ 09:44) (97% - 100%)  I&O's Summary    GENERAL: NAD, well-developed, seated in bed, comfortable  HEAD:  Atraumatic, Normocephalic, MMM  CHEST/LUNG: Clear to auscultation bilaterally; No wheeze  HEART: Regular rate and rhythm; No murmurs, rubs, or gallops  ABDOMEN: Soft, Nontender, Nondistended; Bowel sounds present  EXTREMITIES:  2+ Peripheral Pulses, No clubbing, cyanosis, or edema  PSYCH: AAOx3  NEUROLOGY: CN II-XII grossly intact, moving all extremities      LABS:  CAPILLARY BLOOD GLUCOSE      POCT Blood Glucose.: 247 mg/dL (2019 09:22)  POCT Blood Glucose.: 281 mg/dL (2019 18:06)                          11.4   8.97  )-----------( 149      ( 2019 05:20 )             33.1     11-03    130<L>  |  94<L>  |  12  ----------------------------<  265<H>  4.1   |  23  |  0.75    Ca    8.8      2019 05:20  Phos  1.4     11-  Mg     1.5     11-    TPro  7.5  /  Alb  3.9  /  TBili  0.4  /  DBili  x   /  AST  19  /  ALT  16  /  AlkPhos  58  11-02          Urinalysis Basic - ( 2019 18:40 )    Color: YELLOW / Appearance: CLEAR / S.030 / pH: 6.0  Gluc: >1000 / Ketone: SMALL  / Bili: NEGATIVE / Urobili: NORMAL   Blood: SMALL / Protein: 100 / Nitrite: NEGATIVE   Leuk Esterase: SMALL / RBC: 6-10 / WBC 11-25   Sq Epi: FEW / Non Sq Epi: x / Bacteria: NEGATIVE          RADIOLOGY & ADDITIONAL TESTS:    Telemetry Personally Reviewed -     Imaging Personally Reviewed -     Imaging Reviewed - CT chest - no PNA    Consultant(s) Notes Reviewed -       Care Discussed with Consultants/Other Providers -

## 2019-11-03 NOTE — DISCHARGE NOTE PROVIDER - NSDCCPCAREPLAN_GEN_ALL_CORE_FT
PRINCIPAL DISCHARGE DIAGNOSIS  Diagnosis: Viral pneumonia  Assessment and Plan of Treatment: RVP was neg, Patient was on Ceftriaxone and azithromax.  Chest CT showed no Pneumonia, Blood culture- neg, Urine culture- neg, Patient has viral pneumonia; will continue to monitor; If patient continues to more shortness of breath with fever , please return to Hospital      SECONDARY DISCHARGE DIAGNOSES  Diagnosis: Hyponatremia  Assessment and Plan of Treatment: admisssion sodium was 129.  IV fluid was given and Sodium improved.  continue monitor    Diagnosis: Sepsis  Assessment and Plan of Treatment: IV fluid was given; abx were given, Sepsis resolved.

## 2019-11-04 VITALS — DIASTOLIC BLOOD PRESSURE: 98 MMHG | SYSTOLIC BLOOD PRESSURE: 169 MMHG | HEART RATE: 93 BPM

## 2019-11-04 LAB
ANION GAP SERPL CALC-SCNC: 8 MMO/L — SIGNIFICANT CHANGE UP (ref 7–14)
BACTERIA UR CULT: SIGNIFICANT CHANGE UP
BUN SERPL-MCNC: 16 MG/DL — SIGNIFICANT CHANGE UP (ref 7–23)
CALCIUM SERPL-MCNC: 9.1 MG/DL — SIGNIFICANT CHANGE UP (ref 8.4–10.5)
CHLORIDE SERPL-SCNC: 98 MMOL/L — SIGNIFICANT CHANGE UP (ref 98–107)
CO2 SERPL-SCNC: 26 MMOL/L — SIGNIFICANT CHANGE UP (ref 22–31)
CREAT SERPL-MCNC: 0.78 MG/DL — SIGNIFICANT CHANGE UP (ref 0.5–1.3)
GLUCOSE SERPL-MCNC: 317 MG/DL — HIGH (ref 70–99)
L PNEUMO AG UR QL: NEGATIVE — SIGNIFICANT CHANGE UP
MAGNESIUM SERPL-MCNC: 1.7 MG/DL — SIGNIFICANT CHANGE UP (ref 1.6–2.6)
PHOSPHATE SERPL-MCNC: 2.7 MG/DL — SIGNIFICANT CHANGE UP (ref 2.5–4.5)
POTASSIUM SERPL-MCNC: 4.2 MMOL/L — SIGNIFICANT CHANGE UP (ref 3.5–5.3)
POTASSIUM SERPL-SCNC: 4.2 MMOL/L — SIGNIFICANT CHANGE UP (ref 3.5–5.3)
SODIUM SERPL-SCNC: 132 MMOL/L — LOW (ref 135–145)
SPECIMEN SOURCE: SIGNIFICANT CHANGE UP

## 2019-11-04 PROCEDURE — 99239 HOSP IP/OBS DSCHRG MGMT >30: CPT

## 2019-11-04 RX ORDER — ROSUVASTATIN CALCIUM 5 MG/1
1 TABLET ORAL
Qty: 0 | Refills: 0 | DISCHARGE

## 2019-11-04 RX ORDER — SODIUM CHLORIDE 9 MG/ML
1000 INJECTION, SOLUTION INTRAVENOUS
Refills: 0 | Status: COMPLETED | OUTPATIENT
Start: 2019-11-04 | End: 2019-11-04

## 2019-11-04 RX ORDER — METOPROLOL TARTRATE 50 MG
2.5 TABLET ORAL ONCE
Refills: 0 | Status: COMPLETED | OUTPATIENT
Start: 2019-11-04 | End: 2019-11-04

## 2019-11-04 RX ORDER — ACETAMINOPHEN 500 MG
650 TABLET ORAL EVERY 6 HOURS
Refills: 0 | Status: DISCONTINUED | OUTPATIENT
Start: 2019-11-04 | End: 2019-11-04

## 2019-11-04 RX ORDER — OLMESARTAN MEDOXOMIL-HYDROCHLOROTHIAZIDE 25; 40 MG/1; MG/1
1 TABLET, FILM COATED ORAL
Qty: 0 | Refills: 0 | DISCHARGE

## 2019-11-04 RX ORDER — GLYBURIDE 5 MG
1 TABLET ORAL
Qty: 0 | Refills: 0 | DISCHARGE

## 2019-11-04 RX ORDER — LOSARTAN POTASSIUM 100 MG/1
1 TABLET, FILM COATED ORAL
Qty: 30 | Refills: 0
Start: 2019-11-04 | End: 2019-12-03

## 2019-11-04 RX ADMIN — LOSARTAN POTASSIUM 100 MILLIGRAM(S): 100 TABLET, FILM COATED ORAL at 05:20

## 2019-11-04 RX ADMIN — Medication 4: at 17:29

## 2019-11-04 RX ADMIN — Medication 3: at 08:29

## 2019-11-04 RX ADMIN — Medication 4: at 12:15

## 2019-11-04 RX ADMIN — SODIUM CHLORIDE 125 MILLILITER(S): 9 INJECTION, SOLUTION INTRAVENOUS at 10:23

## 2019-11-04 RX ADMIN — RALOXIFENE HYDROCHLORIDE 60 MILLIGRAM(S): 60 TABLET, COATED ORAL at 12:16

## 2019-11-04 RX ADMIN — Medication 3 MILLILITER(S): at 04:02

## 2019-11-04 RX ADMIN — HEPARIN SODIUM 5000 UNIT(S): 5000 INJECTION INTRAVENOUS; SUBCUTANEOUS at 05:20

## 2019-11-04 RX ADMIN — Medication 1000 UNIT(S): at 12:16

## 2019-11-04 RX ADMIN — Medication 81 MILLIGRAM(S): at 12:16

## 2019-11-04 RX ADMIN — Medication 25 MICROGRAM(S): at 05:20

## 2019-11-04 RX ADMIN — Medication 1 TABLET(S): at 12:16

## 2019-11-04 RX ADMIN — Medication 2.5 MILLIGRAM(S): at 17:29

## 2019-11-04 RX ADMIN — Medication 3 MILLILITER(S): at 09:25

## 2019-11-04 NOTE — PROGRESS NOTE ADULT - PROBLEM SELECTOR PLAN 4
- Holding home HCTZ in the setting of hyponatremia  - Continue with Losartan 100 mg with holding parameters as home equivalent of Olmesartan  - Monitor BP
- Holding home HCTZ in the setting of hyponatremia  - Continue with Losartan 100 mg with holding parameters as home equivalent of Olmesartan  - Monitor BP, adjust regimen as needed

## 2019-11-04 NOTE — PROGRESS NOTE ADULT - PROBLEM SELECTOR PLAN 2
- Corrects to mild hypoNa after accounting for glucose  - Hyponatremia could be in the setting of use of HCTZ   - Continue with IVF hydration with NS  - hold thiazide
- Corrects to mild hypoNa after accounting for glucose  - Hyponatremia could be in the setting of use of HCTZ   - hold thiazide

## 2019-11-04 NOTE — PROGRESS NOTE ADULT - SUBJECTIVE AND OBJECTIVE BOX
[facility] Division of Hospital Medicine  Luigi Newell MD  Pager [phone]    Patient is a 73y old  Female who presents with a chief complaint of Cough (2019 11:30)      SUBJECTIVE / OVERNIGHT EVENTS:  ADDITIONAL REVIEW OF SYSTEMS:    MEDICATIONS  (STANDING):  aspirin  chewable 81 milliGRAM(s) Oral daily  atorvastatin 40 milliGRAM(s) Oral at bedtime  cholecalciferol 1000 Unit(s) Oral daily  dextrose 5%. 1000 milliLiter(s) (50 mL/Hr) IV Continuous <Continuous>  dextrose 50% Injectable 12.5 Gram(s) IV Push once  dextrose 50% Injectable 25 Gram(s) IV Push once  dextrose 50% Injectable 25 Gram(s) IV Push once  heparin  Injectable 5000 Unit(s) SubCutaneous every 12 hours  insulin lispro (HumaLOG) corrective regimen sliding scale   SubCutaneous three times a day before meals  insulin lispro (HumaLOG) corrective regimen sliding scale   SubCutaneous at bedtime  levothyroxine 25 MICROGram(s) Oral daily  losartan 100 milliGRAM(s) Oral daily  multivitamin 1 Tablet(s) Oral daily  raloxifene 60 milliGRAM(s) Oral daily    MEDICATIONS  (PRN):  acetaminophen   Tablet .. 650 milliGRAM(s) Oral every 6 hours PRN Mild Pain (1 - 3), Moderate Pain (4 - 6), Severe Pain (7 - 10)  albuterol/ipratropium for Nebulization 3 milliLiter(s) Nebulizer every 6 hours PRN Shortness of Breath and/or Wheezing  dextrose 40% Gel 15 Gram(s) Oral once PRN Blood Glucose LESS THAN 70 milliGRAM(s)/deciliter  glucagon  Injectable 1 milliGRAM(s) IntraMuscular once PRN Glucose LESS THAN 70 milligrams/deciliter      CAPILLARY BLOOD GLUCOSE      POCT Blood Glucose.: 331 mg/dL (2019 12:08)  POCT Blood Glucose.: 268 mg/dL (2019 08:13)  POCT Blood Glucose.: 251 mg/dL (2019 21:48)  POCT Blood Glucose.: 277 mg/dL (2019 17:08)    I&O's Summary      PHYSICAL EXAM:  Vital Signs Last 24 Hrs  T(C): 36.8 (2019 12:31), Max: 36.8 (2019 15:00)  T(F): 98.3 (2019 12:31), Max: 98.3 (2019 15:00)  HR: 99 (2019 12:31) (61 - 107)  BP: 125/80 (2019 12:31) (121/58 - 145/88)  BP(mean): --  RR: 18 (2019 12:31) (17 - 18)  SpO2: 100% (2019 12:31) (97% - 100%)  CONSTITUTIONAL:   EYES:   ENMT:   NECK:   RESPIRATORY:   CARDIOVASCULAR:   ABDOMEN:   MUSCULOSKELETAL:   PSYCH:   NEUROLOGY:   SKIN:    LABS:                        11.4   8.97  )-----------( 149      ( 2019 05:20 )             33.1     11-04    132<L>  |  98  |  16  ----------------------------<  317<H>  4.2   |  26  |  0.78    Ca    9.1      2019 06:50  Phos  2.7       Mg     1.7         TPro  7.5  /  Alb  3.9  /  TBili  0.4  /  DBili  x   /  AST  19  /  ALT  16  /  AlkPhos  58  11-02          Urinalysis Basic - ( 2019 18:40 )    Color: YELLOW / Appearance: CLEAR / S.030 / pH: 6.0  Gluc: >1000 / Ketone: SMALL  / Bili: NEGATIVE / Urobili: NORMAL   Blood: SMALL / Protein: 100 / Nitrite: NEGATIVE   Leuk Esterase: SMALL / RBC: 6-10 / WBC 11-25   Sq Epi: FEW / Non Sq Epi: x / Bacteria: NEGATIVE        Culture - Blood (collected 2019 19:43)  Source: BLOOD VENOUS  Preliminary Report (2019 19:44):    NO ORGANISMS ISOLATED    NO ORGANISMS ISOLATED AT 24 HOURS    Culture - Blood (collected 2019 19:43)  Source: BLOOD PERIPHERAL  Preliminary Report (2019 19:44):    NO ORGANISMS ISOLATED    NO ORGANISMS ISOLATED AT 24 HOURS    Culture - Urine (collected 2019 19:30)  Source: URINE MIDSTREAM  Final Report (2019 08:54):    NO GROWTH AT 24 HOURS        RADIOLOGY & ADDITIONAL TESTS:  Results Reviewed:   Imaging Personally Reviewed:  Electrocardiogram Personally Reviewed:    COORDINATION OF CARE:  Care Discussed with Consultants/Other Providers [Y/N]:  Prior or Outpatient Records Reviewed [Y/N]: [facility] Division of Hospital Medicine  Luigi Newell MD  Pager [phone]    Patient is a 73y old  Female who presents with a chief complaint of Cough (2019 11:30)    SUBJECTIVE / OVERNIGHT EVENTS:  Daughter at bedside. Patient reports feeling much better. Denies fever/chills/SOB/abdominal pain/nausea/vomiting. No issues eating or going to the bathroom. Feels well, wants to go home today. Daughter at bedside, in agreement with going home.  Aware of need to followup with PMD for better glycemic control.     MEDICATIONS  (STANDING):  aspirin  chewable 81 milliGRAM(s) Oral daily  atorvastatin 40 milliGRAM(s) Oral at bedtime  cholecalciferol 1000 Unit(s) Oral daily  dextrose 5%. 1000 milliLiter(s) (50 mL/Hr) IV Continuous <Continuous>  dextrose 50% Injectable 12.5 Gram(s) IV Push once  dextrose 50% Injectable 25 Gram(s) IV Push once  dextrose 50% Injectable 25 Gram(s) IV Push once  heparin  Injectable 5000 Unit(s) SubCutaneous every 12 hours  insulin lispro (HumaLOG) corrective regimen sliding scale   SubCutaneous three times a day before meals  insulin lispro (HumaLOG) corrective regimen sliding scale   SubCutaneous at bedtime  levothyroxine 25 MICROGram(s) Oral daily  losartan 100 milliGRAM(s) Oral daily  multivitamin 1 Tablet(s) Oral daily  raloxifene 60 milliGRAM(s) Oral daily    MEDICATIONS  (PRN):  acetaminophen   Tablet .. 650 milliGRAM(s) Oral every 6 hours PRN Mild Pain (1 - 3), Moderate Pain (4 - 6), Severe Pain (7 - 10)  albuterol/ipratropium for Nebulization 3 milliLiter(s) Nebulizer every 6 hours PRN Shortness of Breath and/or Wheezing  dextrose 40% Gel 15 Gram(s) Oral once PRN Blood Glucose LESS THAN 70 milliGRAM(s)/deciliter  glucagon  Injectable 1 milliGRAM(s) IntraMuscular once PRN Glucose LESS THAN 70 milligrams/deciliter      CAPILLARY BLOOD GLUCOSE  POCT Blood Glucose.: 331 mg/dL (2019 12:08)  POCT Blood Glucose.: 268 mg/dL (2019 08:13)  POCT Blood Glucose.: 251 mg/dL (2019 21:48)  POCT Blood Glucose.: 277 mg/dL (2019 17:08)    I&O's Summary    PHYSICAL EXAM:  Vital Signs Last 24 Hrs  T(C): 36.8 (2019 12:31), Max: 36.8 (2019 15:00)  T(F): 98.3 (2019 12:31), Max: 98.3 (2019 15:00)  HR: 99 (2019 12:31) (61 - 107)  BP: 125/80 (2019 12:31) (121/58 - 145/88)  BP(mean): --  RR: 18 (2019 12:31) (17 - 18)  SpO2: 100% (2019 12:31) (97% - 100%)    CONSTITUTIONAL: NAD, sitting in bed, daughter at bedside  EYES: EOMI, clear sclera/conjunctiva  ENMT: MMM  NECK: supple  RESPIRATORY: CTAB, comfortable on RA, speaking in full sentences  CARDIOVASCULAR: S1S2 RRR  ABDOMEN: soft, non-tender, no rebound/guarding  MUSCULOSKELETAL: no c/c/e   PSYCH: calm  NEUROLOGY: non-focal      LABS:                        11.4   8.97  )-----------( 149      ( 2019 05:20 )             33.1     11-04    132<L>  |  98  |  16  ----------------------------<  317<H>  4.2   |  26  |  0.78    Ca    9.1      2019 06:50  Phos  2.7     11-  Mg     1.7     11-    TPro  7.5  /  Alb  3.9  /  TBili  0.4  /  DBili  x   /  AST  19  /  ALT  16  /  AlkPhos  58  11-02    Urinalysis Basic - ( 2019 18:40 )  Color: YELLOW / Appearance: CLEAR / S.030 / pH: 6.0  Gluc: >1000 / Ketone: SMALL  / Bili: NEGATIVE / Urobili: NORMAL   Blood: SMALL / Protein: 100 / Nitrite: NEGATIVE   Leuk Esterase: SMALL / RBC: 6-10 / WBC 11-25   Sq Epi: FEW / Non Sq Epi: x / Bacteria: NEGATIVE    Culture - Blood (collected 2019 19:43)  Source: BLOOD VENOUS  Preliminary Report (2019 19:44):    NO ORGANISMS ISOLATED    NO ORGANISMS ISOLATED AT 24 HOURS    Culture - Blood (collected 2019 19:43)  Source: BLOOD PERIPHERAL  Preliminary Report (2019 19:44):    NO ORGANISMS ISOLATED    NO ORGANISMS ISOLATED AT 24 HOURS    Culture - Urine (collected 2019 19:30)  Source: URINE MIDSTREAM  Final Report (2019 08:54):    NO GROWTH AT 24 HOURS    RADIOLOGY & ADDITIONAL TESTS:  Results Reviewed:   < from: CT Abdomen and Pelvis w/ IV Cont (19 @ 13:31) >    LOWER CHEST: Trace bilateral lateral pleural effusions with associated   bibasilar subsegmental atelectasis.    LIVER: Within normal limits.  BILE DUCTS: Normal caliber.  GALLBLADDER: Within normal limits.  SPLEEN: Within normal limits.  PANCREAS: Within normal limits.  ADRENALS: Within normal limits.  KIDNEYS/URETERS: Within normallimits.    BLADDER: Within normal limits.  REPRODUCTIVE ORGANS: Uterus and adnexa within normal limits.    BOWEL: No bowel obstruction. Appendix normal.  PERITONEUM: No ascites.  VESSELS: Calcified aortoiliac atherosclerosis  RETROPERITONEUM/LYMPH NODES: No lymphadenopathy.    ABDOMINAL WALL: Within normal limits.  BONES: Spinal degenerative changes.    IMPRESSION:     No CT evidence to explain the patient's abdominal pain.    < end of copied text >    Imaging Personally Reviewed:  Electrocardiogram Personally Reviewed:    COORDINATION OF CARE:  Care Discussed with Consultants/Other Providers [Y]: PMD Dr. Atkins re: hospital course and plan for discharge. CAMMY Hensley  Prior or Outpatient Records Reviewed [N]:

## 2019-11-04 NOTE — PROGRESS NOTE ADULT - PROBLEM SELECTOR PLAN 5
- Patient reports not being adherent to cholesterol medication at home  - Continue with Rosuvastatin 10 mg   - Check lipid panel in the am
- Patient reports not being adherent to cholesterol medication at home  - Continue with Rosuvastatin 10 mg   - lipid profile noted

## 2019-11-04 NOTE — PROGRESS NOTE ADULT - PROBLEM SELECTOR PLAN 3
- T2DM not on home insulin   - Holding home diabetic medication including Glyburide, Metformin and Sitagliptin   - Continue with ISS for now, goal -180 while in the hospital   - Patient is of advanced age, would benefit from discontinuation of Sulfonylurea to prevent hypoglycemia at discharge
- T2DM not on home insulin   - Holding home diabetic medication including Glyburide, Metformin and Sitagliptin   - Continue with ISS for now, goal -180 while in the hospital   - Patient is of advanced age, would benefit from discontinuation of Sulfonylurea to prevent hypoglycemia at discharge  - will followup with PMD for further management of diabetes, patient reports "hypoglycemia" symptoms when her sugars are less than 150, discussed importance of better glycemic control  - A1C 7.8

## 2019-11-04 NOTE — PROGRESS NOTE ADULT - PROBLEM SELECTOR PLAN 1
- Patient presented with fever, tachycardia, leukocytosis and tachypnea meeting all SIRS criteria likely source being pneumonia based on clinical symptoms   - Lactate improved with IVF from 3.2--> 2.1  - CT and CXR without PNA, patient reports supraumbilical abdominal pain radiating to back, will obtaon CT a/p with IV contrast and add on serum lipase; Hgb  slightly lower than yesterday but all cell lines declined  - Continue with gentle IVF hydration
- Patient presented with fever, tachycardia, leukocytosis and tachypnea meeting all SIRS criteria, likely source being pneumonia based on clinical symptoms   - Lactate improved with IVF from 3.2--> 2.1  - CXR and CT chest reviewed, w/o e/o bacterial PNA  - patient had complained of abdominal pain radiating to the back, CT abdomen negative for acute intraabdominal pathology, no e/o pancreatitis (lipase also wnl)  - given negative chest imaging, suspect patient presented with viral pneumonia  - sepsis resolved, will hold off on further antibiotics

## 2019-11-04 NOTE — PROGRESS NOTE ADULT - PROBLEM SELECTOR PLAN 6
- Continue with vitamin D and multivitamin for supplement  - Continue with home Raloxifene 60 mg QD  - Fall precaution
- Continue with vitamin D and multivitamin for supplement  - Continue with home Raloxifene 60 mg QD  - Fall precaution

## 2019-11-04 NOTE — CHART NOTE - NSCHARTNOTEFT_GEN_A_CORE
/84 will give metoprolol 2.5mg x1.  continue to monitor.  Shellie SAHU /84 will give metoprolol 2.5mg x1. PMD notified and will discharge patient with above BP and follow up with primary care physician. continue to monitor.  Shellie SAHU

## 2019-11-04 NOTE — DISCHARGE NOTE NURSING/CASE MANAGEMENT/SOCIAL WORK - PATIENT PORTAL LINK FT
You can access the FollowMyHealth Patient Portal offered by [facility] by registering at the following website: [email or web address]. By joining [facility]’s FollowMyHealth portal, you will also be able to view your health information using other applications (apps) compatible with our system.

## 2019-11-04 NOTE — PROGRESS NOTE ADULT - PROBLEM SELECTOR PLAN 7
- DVT prophylaxis: IMPROVE score 1 (age), heparin subQ  - GI prophylaxis: not indicated  - Diet: DASH/CC diet
- DVT prophylaxis: IMPROVE score 1 (age), heparin subQ  - GI prophylaxis: not indicated  - Diet: DASH/CC diet

## 2019-11-04 NOTE — PROGRESS NOTE ADULT - ATTENDING COMMENTS
Patient and daughter in agreement with discharge plan. Anticipatory guidance provided. PMD contacted on day of discharge, updated on hospital course. States she will see the patient in the office in the next day or two (patient's daughter can call to make an appointment).     Discharge time 38 minutes

## 2019-11-04 NOTE — PROGRESS NOTE ADULT - ASSESSMENT
Patient is a 73 year old woman with history of HTN, T2DM, HLD, osteoporosis presented with nonproductive cough for 3 days, admitted for sepsis secondary to presumed pneumonia but CXR and CT w/o PNA.
73 year old female with history of HTN, T2DM, HLD, osteoporosis presented with nonproductive cough for 3 days, admitted for sepsis secondary to presumed pneumonia. As imaging negative, suspicion is for viral pneumonia. Patient stable for discharge home with outpatient followup.

## 2019-11-04 NOTE — PROGRESS NOTE ADULT - PROBLEM SELECTOR PLAN 8
1.  Name of PCP: Margarita Mcgee   2.  PCP Contacted on Admission: [ ] Y    [ x ] N  admitted overnight  3.  PCP contacted at Discharge: [ ] Y    [ ] N    [ ] N/A  4.  Post-Discharge Appointment Date and Location:  5.  Summary of Handoff given to PCP:]
1.  Name of PCP: Margarita Mcgee   2.  PCP Contacted on Admission: [ ] Y    [ x ] N  admitted overnight  3.  PCP contacted at Discharge: [ x] Y    [ ] N    [ ] N/A Spoke with PMD 11/4  4.  Post-Discharge Appointment Date and Location:  5.  Summary of Handoff given to PCP: admitted on 11/2 with concern for sepsis 2/2 PNA, given negative imaging suspicion is for viral PNA, patient currently improved, will need otpt followup of diabetes

## 2019-11-07 LAB
BACTERIA BLD CULT: SIGNIFICANT CHANGE UP
BACTERIA BLD CULT: SIGNIFICANT CHANGE UP

## 2020-08-24 NOTE — PATIENT PROFILE ADULT - NSPROMEDSPUMP_GEN_A_NUR
IP F/U    Date: 8/22/20  Diagnosis: Bradycardia, Severe Sinus  Is patient active in care coordination? No  Was patient in TCU? No    Next 5 appointments (look out 90 days)    Aug 26, 2020  1:40 PM CDT  SHORT with Lucía Sandhu MD  [facility] ([facility]) [address]  [facility] [address]  [phone]          
Per chart, care coordination has contacted patient. Will close encounter.   
none

## 2021-04-14 NOTE — H&P ADULT - PROBLEM SELECTOR PROBLEM 9
Referred by: Anthony Khan PA-C; Medical Diagnosis (from order):    Diagnosis Information      Diagnosis    722.91, 723.4 (ICD-9-CM) - M50.123 (ICD-10-CM) - Cervical disc disorder at C6-C7 level with radiculopathy                Physical Therapy -  Daily Treatment Note    Visit:  73 Visit: 84  Diagnosis Precautions: none    SUBJECTIVE                                                                                                             No bad days this last week, pretty status quo. The R SCM has been a bit better and more of the suboccipital tension on the L. He feels like his headaches had been pretty good but he had more of one yesterday with the muscle pressure.     Pain / Symptoms:  Pain rating (out of 10): Current: 4     OBJECTIVE                                                                                                                     Range of Motion (ROM)   (degrees unless noted; active unless noted; norms in ( ); negative=lacking to 0, positive=beyond 0)   Cervical:    - Rotation (60-80):        • Left: 60%        • Right: 40% pain      TREATMENT                                                                                                                  Manual Therapy:  -suboccipital release with manual chin tucks  -STM to cervical paraspinals  -cervical rotation METs  -supine pec stretch with overpressure into 90/90 position  -median and ulnar nerve floss  -1st rib strain/counterstrain  -IASTM to rhomboids, middle trap, upper trap post dry needling - increased focus here today    Dry Needling:  Dry Needling Informed Consent Signed: Yes  Education about indications, contraindications and potential side effects completed with patient.  Screen Completed    - Precautions: local skin lesions, lyme disease, local lymphedema, severe hyperalgesia/allodynia, metal allergies: nickel and chromium, abnormal bleeding tendency, immunodeficiency and/or compromised immune system, second or third  trimester of pregnancy, vascular disease, history of spontaneous pneumothorax   - Contraindications: local or systemic infections including the flu, over implants, active cancer, area of lymphatic compromise, area of lumpectomy/mastectomy, first trimester of pregnancy      - Location: R upper trap Needle size: 30x30 Quantity: 5   - Location: L upper trap Needle size: 30x30 Quantity: 6   - Location: R glut med spine Needle size: 30x60 Quantity: 3      Total Needles Inserted: 14 Removed: 14    Skilled input: verbal instruction/cues, tactile instruction/cues and posture correction    Writer verbally educated and received verbal consent for hand placement, positioning of patient, and techniques to be performed today from patient for clothing adjustments for techniques, hand placement and palpation for techniques and therapist position for techniques as described above and how they are pertinent to the patient's plan of care.    Home Exercise Program: rib stretch                            SA Row - not completed last 2 days due to increased nerve pain                              Pec stretch                                Foam rolling                              Bilateral ER                               Upper trap stretch      Pretzel stretch                                                                      Brachial plexus nerve floss                    Cervical home traction - continue at only 1x per day     ASSESSMENT                                                                                                             Increased trigger points targeted and release through dry needling of B upper traps with increased red response from IASTM surrounding L rhomboids and lats. Continue at 1x per week for another 1-2 visits before switching back to our 10 day frequency to address excess muscular tension and neural tension caused by longer work hours this time of year for patient.  Pain/symptoms after session:  3    Patient Education:   Results of above outlined education: Verbalizes understanding and Demonstrates understanding      PLAN                                                                                                                           Suggestions for next session as indicated: Progress per plan of care, trial 9 days between sessions with a focus on IASTM management at next visit         Procedures and total treatment time documented Time Entry flowsheet.   Discharge planning issues

## 2021-11-22 NOTE — ED ADULT NURSE NOTE - NSFALLRSKHARMRISK_ED_ALL_ED
"Patient calling to see how much Vitamin D3 Dr. Coley would recommend for patient. Patient says that she is very \"seasonal\" and has a hard time this time of year.   Please return call to patient at  # [phone] okay to leave a detailed voicemail  Chen Kim RN on 11/22/2021 at 1:40 PM    " no

## 2022-07-12 NOTE — H&P ADULT - NSHPPHYSICALEXAM_GEN_ALL_CORE
no Vital Signs Last 24 Hrs  T(C): 36.8 (02 Nov 2019 20:49), Max: 39.1 (02 Nov 2019 18:37)  T(F): 98.3 (02 Nov 2019 20:49), Max: 102.3 (02 Nov 2019 18:37)  HR: 100 (02 Nov 2019 20:49) (100 - 118)  BP: 128/97 (02 Nov 2019 20:49) (128/97 - 160/86)  BP(mean): --  RR: 25 (02 Nov 2019 20:49) (16 - 25)  SpO2: 99% (02 Nov 2019 20:49) (98% - 99%)    CAPILLARY BLOOD GLUCOSE      POCT Blood Glucose.: 281 mg/dL (02 Nov 2019 18:06)      PHYSICAL EXAM:  General: Alert and cooperative. Not in acute stress. Well developed, well nourished.   Head: Normocephalic   Eyes:  PERRLA, clear conjunctiva. EOMI, no ptosis.   Throat: Oral cavity and pharynx normal. No inflammation, swelling, exudate, or lesions. Teeth and gingiva in good general condition.  Neck: Posterior cervical LN appreciated.    Respiratory: Bilateral lung with diffuse inspiratory wheezing,   Cardiovascular: S1/S2 auscultated, no murmur, or gallop. Rhythm is regular. There is no peripheral edema   Abdomen: Soft, non-tender, nondistended, no guarding or rebound tenderness. Active bowel sounds in all 4 quadrants    Extremities: No significant deformity or joint abnormality. Peripheral pulses intact.  No peripheral edema   Skin: Intact, no rash   Neurological: AOAx4. CN2-12 grosslly intact. Strength intact no Vital Signs Last 24 Hrs  T(C): 36.8 (02 Nov 2019 20:49), Max: 39.1 (02 Nov 2019 18:37)  T(F): 98.3 (02 Nov 2019 20:49), Max: 102.3 (02 Nov 2019 18:37)  HR: 100 (02 Nov 2019 20:49) (100 - 118)  BP: 128/97 (02 Nov 2019 20:49) (128/97 - 160/86)  BP(mean): --  RR: 25 (02 Nov 2019 20:49) (16 - 25)  SpO2: 99% (02 Nov 2019 20:49) (98% - 99%)    CAPILLARY BLOOD GLUCOSE  POCT Blood Glucose.: 281 mg/dL (02 Nov 2019 18:06)    PHYSICAL EXAM:  General: Alert and cooperative. Not in acute stress. Well developed, well nourished.   Head: Normocephalic   Eyes:  PERRLA, clear conjunctiva. EOMI, no ptosis.   Throat: Oral cavity and pharynx normal. No inflammation, swelling, exudate, or lesions. Teeth and gingiva in good general condition.  Neck: Posterior cervical LN appreciated.    Respiratory: Bilateral lung with diffuse inspiratory wheezing [attending exam: good air movement; rhonchi R base, inspiratory wheeze L base otherwise CTA]  Cardiovascular: S1/S2 auscultated, no murmur, or gallop. Rhythm is regular. There is no peripheral edema   Abdomen: Soft, non-tender, nondistended, no guarding or rebound tenderness. Active bowel sounds in all 4 quadrants    Extremities: No significant deformity or joint abnormality. Peripheral pulses intact.  No peripheral edema   Skin: Intact, no rash   Neurological: AOAx4. CN2-12 grosslly intact. Strength intact

## 2022-08-02 NOTE — H&P ADULT - PROBLEM SELECTOR PROBLEM 5
History and Physical    Patient: Ras Antonio MRN: [id number]  SSN: [id number]    YOB: 1953  Age: 76 y.o. Sex: female      Subjective:      Ras Antonio is a 76 y.o. female who presents to [facility] ER with complaint of Left Foot Blister and Left Ankle Pain. Patient reports a blister on her ankle that ruptured and drained serous fluid with a large blister on her left foot currently. In regards to her back wound, Patient reports that she slid (vehemently denies that she \"fell\") and was found down by her Brothers and Jennifer Mojica prior to being hospitalized in in mid-July. Patient initially did not acknowledge that she had a brain mass, but eventually did report that she is scheduled to see a Neurosurgeon regarding the matter. Patient denies weakness or difficulty moving her limbs, but reports that she is ambulatory with a Rolator at home and cannot roll aside in bed during examination without assistance. Patient reports poor appetite and 20-25 lb weight loss over approximately 9 months. Patient complains of fatigue, constipation, and occasional straining with BMs. Patient requests that the fluid be removed from her legs and speaks extensively regarding her dietary habits. Patient denies fevers, nausea, vomiting, diarrhea, dysuria, cough, chest pain, pain with inspiration, abdominal pain, LUTS, shortness of breath, orthopnea, and weight gain. Patient's Primary Oncologist is Gwendolyn Sandoval. Chiara Marvin M.D. Neurosurgeon with whom Patient is reportedly scheduled is Veena Casillas M.D. In [facility] ER, Patient is noted to have Temperature 99.1°F, Heart Rate 110 bpm, Respiration Rate 23 bpm, Blood Pressure 122/67 mm Hg to 152/66 mm Hg, SpO2 99% on Room Air, WBC 16.5, Neut% 78%, Neut# 12.8, UA (-), K+ 3.4, mmol/L, Glucose 109 mg/dL, BUN 16, Creatinine 1.00, eGFR 55/>60, Albumin 2.9, ALT 42, AST 36, Alk Phos 123, , Pro-, Troponin 9 ng/L, and Lactic Acid 3.95.   Patient received IV Vancomycin 1,000 mg, IV Zosyn 4.5 grams, IV Levofloxacin 740 mg, and NS 1 L in [facility] ER. Notably, Patient has previously refused Hospice and also refused \"strangers in her home\" in regards to [employer]. Patient is admitted to [facility] Telemetry Unit for management of Severe Sepsis 2°/2 LLE Wound of Lower Back and also Draining Blister of Left Ankle. 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    Cancer ([address] 01/01/2014    breast    Ductal carcinoma in situ of breast     Functional quadriplegia ([facility] [address]     as of 7/2022    Glaucoma     History of seizure disorder     Open back wound 07/2022    Pressure wound for lying on a table for indeterminate amount of time    Pituitary adenoma with extrasellar extension ([facility] [address]     Primary hypertension      Past Surgical History:   Procedure Laterality Date    HX HYSTERECTOMY      partial    HX MASTECTOMY  4/30/2014    RIGHT PARTIAL MASTECTOMY WITH NEEDLE LOCALIZATION MAMMOGRAM SENTINEL LYMPH NODE BIOPSY performed by Tish Mariano MD at [facility] MAIN OR      Family History   Problem Relation Age of Onset    Colon Cancer Mother      Social History     Tobacco Use    Smoking status: Never    Smokeless tobacco: Never   Substance Use Topics    Alcohol use: No     Alcohol/week: 0.0 standard drinks      Prior to Admission medications    Medication Sig Start Date End Date Taking? Authorizing Provider   amLODIPine (NORVASC) 10 mg tablet Take 10 mg by mouth in the morning. Yes Other, MD Chavo   dexAMETHasone (DECADRON) 2 mg tablet Take  by mouth every twelve (12) hours. Yes Other, MD Chavo   levothyroxine (SYNTHROID) 88 mcg tablet Take 88 mcg by mouth Daily (before breakfast). Yes Other, MD Chavo   divalproex ER (DEPAKOTE ER) 500 mg ER tablet Take 500 mg by mouth.    Yes Other, MD Chavo   hydrALAZINE (APRESOLINE) 25 mg tablet Take 25 mg by mouth three (3) times daily. Yes Other, MD Chavo   gabapentin (NEURONTIN) 300 mg capsule Take 1 tab PO QHS x1 week then increase to BID thereafter 5/22/19  Yes Brijesh Merida MD   baclofen (LIORESAL) 10 mg tablet Take 1 Tab by mouth three (3) times daily. 3/15/19  Yes SRINIVASAN Marquez   tamoxifen (NOLVADEX) 10 mg tablet Take  by mouth daily. Yes Provider, Historical   oxyCODONE-acetaminophen (PERCOCET) 5-325 mg per tablet 1 or 2 tablets by mouth every 4 hours as needed 5/1/14  Yes Fredo Rodriguez MD   OTHER Vitamin D Po   Yes Provider, Historical   nebivoloL (BYSTOLIC) 20 mg tablet Take  by mouth daily. Yes Provider, Historical   torsemide (DEMADEX) 20 mg tablet Take  by mouth daily. Yes Provider, Historical   OTHER [place]uv[place] eye vitamin OTC   Yes Provider, Historical   cyanocobalamin (VITAMIN B12) 2,500 mcg sublingual tablet Take 2,500 mcg by mouth daily. Yes Provider, Historical   pyridoxine, vitamin B6, 200 mg tablet Take 200 mg by mouth daily. Yes Provider, Historical   BIOTIN PO Take 1,000 mg by mouth.    Yes Provider, Historical   diazePAM (VALIUM) 10 mg tablet TAKE 1 TAB PO 30 MINS PRIOR TO MRI (MUST HAVE SOMEONE TO DRIVE TO AND FROM THE FACILITY) 5/22/19   Brijesh Merida MD   diclofenac (VOLTAREN) 1 % gel QID to affected areas PRN for pain 5/22/19   Brijesh Merida MD        No Known Allergies    Review of Systems:  (+) Weight Change  (+) Loss of Appetite  (-) Fevers  (+) Chills  (-) Night Sweats  (+) Fatigue  (-) Cough  (-) Increased Sputum Production  (-) Chest Pain  (-) Abdominal Pain  (-) Nausea  (-) Vomiting  (-) Diarrhea  (+) Constipation  (+) Straining with defecation  (-) Dysuria  (-) Seizure  (+) Tremor  (+) Blister  (+) Ulcers  (?) Depression  (-) Diaphoresis  All other systems have been reviewed and are negative      Objective:     Vitals:    08/01/22 1829 08/01/22 2000 08/01/22 2016 08/01/22 2154   BP: 127/72 135/75  122/67   Pulse: 79 76  69   Resp: 12 12  14   Temp:   98.4 °F (36.9 °C) 98.1 °F (36.7 °C)   SpO2: 100% 100%  100%   Weight:    78.6 kg (173 lb 4.8 oz)   Height:    5' 3\" (1.6 m)        Physical Exam:  General:  Older adult female lying in bed in no acute distress  HEENT:  Atraumatic, normocephalic; (+) Glasses in place; (+) Right Pupil with Pterygium/Pinguecula overlying entire pupil and iris; (+) Left Pupil round and reactive to light with accommodation; Extraocular muscles intact; Moist Oropharynx without erythema, edema, or exudates; (+) Edentulous  Chest:  No pectus carinatum; No pectus excavatum; (+) Bandage on Back  Cardiovascular:  Regular rate and rhythm without rubs, gallops, or murmurs  Respiratory:  Clear to Auscultation Bilaterally without wheezes, rales, or rhonchi; normal effort of breathing  Abdominal:  Soft, non-tense, non-tender abdomen; BS present without guarding, rebound, or masses  :  Deferred  Extremities:  Pulses 2+ x4 with (+) 1+ Pitting Edema to Bilateral Proximal-most Thigh without cyanosis; (+) Questionable deformity of Right Foot; (+) 5-6 cm in diameter Bullous on dorsum of Left Midfoot; (+) Bandage on anteromedial aspect of Left Distal Tibia  Musculoskeletal:  Strength 5/5 and symmetrical in BUE; Strength 5/5 in plantarflexion, but (+) Strength 3/5 in Bilateral Dorsiflexion  Integument:  No rash on face, forearms, or legs  Neurological:  Alert & Ostensibly Oriented x4/4; (+) Right Eye Blindness; No gross deficits of Eye Movement, Jaw Opening, Facial Expression, Hearing, Phonation, or Head Movement;  No gross deficits of Tongue Movement or Slurring of Speech; (+) Tremor  Psychiatric:  Affect is appropriate; Language is present, fluent, and (+) Verbose; Behavior is Minimally Eccentric, but otherwise appropriate      Laboratory Studies:  CMP:   Lab Results   Component Value Date/Time     08/01/2022 02:14 PM    K 3.4 (L) 08/01/2022 02:14 PM     08/01/2022 02:14 PM    CO2 31 08/01/2022 02:14 PM    AGAP 6 08/01/2022 02:14 PM     (H) 08/01/2022 02:14 PM    BUN 16 08/01/2022 02:14 PM    CREA 1.00 08/01/2022 02:14 PM    GFRAA >60 08/01/2022 02:14 PM    GFRNA 55 (L) 08/01/2022 02:14 PM    CA 9.3 08/01/2022 02:14 PM    MG 2.2 08/01/2022 02:14 PM    ALB 2.9 (L) 08/01/2022 02:14 PM    TP 6.6 08/01/2022 02:14 PM    GLOB 3.7 08/01/2022 02:14 PM    AGRAT 0.8 08/01/2022 02:14 PM    ALT 42 08/01/2022 02:14 PM     CBC:   Lab Results   Component Value Date/Time    WBC 16.5 (H) 08/01/2022 02:14 PM    HGB 12.2 08/01/2022 02:14 PM    HCT 36.5 08/01/2022 02:14 PM     08/01/2022 02:14 PM     All Cardiac Markers in the last 24 hours:   Lab Results   Component Value Date/Time     (H) 08/01/2022 02:14 PM     Recent Glucose Results:   Lab Results   Component Value Date/Time     (H) 08/01/2022 02:14 PM     COAGS: No results found for: APTT, PTP, INR, INREXT, INREXT     Images Reviewed:  XR CHEST PORT    Result Date: 8/1/2022  EXAM: XR CHEST PORT CLINICAL INDICATION/HISTORY: 76 years Female. chest pain. Additional History: None TECHNIQUE: Frontal view of the chest COMPARISON: Chest radiograph 3/2/2020 FINDINGS: The cardiac silhouette appears within normal limits. Tortuosity of the thoracic aorta, unchanged in appearance. Pulmonary vasculature appears within normal limits. No confluent airspace opacity is appreciated. No definite evidence of pleural effusion or pneumothorax. No acute osseous abnormality appreciated. No radiographic evidence of acute cardiopulmonary process.         Assessment:     Hospital Problems  Date Reviewed: 8/1/2022            Codes Class Noted POA    * (Principal) Severe sepsis ([facility] [address] ICD-10-CM: A41.9, R65.20  ICD-9-CM: 038.9, 995.92  8/1/2022 Yes        Wound of left leg ICD-10-CM: P69.452H  ICD-9-CM: 891.0  8/1/2022 Yes        Open wound of lower back ICD-10-CM: S31.000A  ICD-9-CM: 876.0  8/1/2022 Yes        Glaucoma (Chronic) ICD-10-CM: H40.9  ICD-9-CM: 365.9  8/1/2022 Yes        Primary hypertension (Chronic) ICD-10-CM: I10  ICD-9-CM: 401.9  8/1/2022 Yes        Acquired hypothyroidism (Chronic) ICD-10-CM: E03.9  ICD-9-CM: 244.9  8/1/2022 Yes        Obesity (BMI 30.0-34.9) (Chronic) ICD-10-CM: E66.9  ICD-9-CM: 278.00  8/1/2022 Yes    Overview Signed 8/1/2022 10:26 PM by Naif Richards,      BMI 30.70 on 8/01/2022             Impaired mobility and ADLs ICD-10-CM: Z74.09, Z78.9  ICD-9-CM: V49.89  8/1/2022 Yes    Overview Signed 8/1/2022 11:59 PM by Naif Richards,      Patient is mobile with use of Rolator and has significant debility. History of breast cancer (Chronic) ICD-10-CM: Z85.3  ICD-9-CM: V10.3  5/16/2016 Yes           Plan:     Severe Sepsis 2°/2 Malodorous Wound of Lower Back and Draining Blister of Left Ankle  Telemetry, IV Vancomycin, IV Zosyn, IV Levofloxacin, Wound Culture, and Blood Cultures. Consider discontinuation of Levofloxacin in AM.  Consulted Wound Care. Brain Mass  - Currently has a Neurosurgical appointment. Continue home Dexamethasone 2 mg BID. BLE Edema  - Patient has cited this as a primary concern and desires diuresis  HOLD home Torsemide at this time until Lactic Acid improves. Hypertension  Continue home Amlodipine, Hydralazine, and Beta-Blocker Therapy. Hypothyroidism  Continue home Levothyroxine. Impaired Mobility and ADLs  - Patient has previously refused strangers in her home  Patient is not capable of rolling aside in bed without assistance and is mobile with Rolator. Suspect Patient is downplaying her debility. PT/OT. History of Seizure Disorder  Continue home Divalproex and Gabapentin. History of Breast Cancer  Continue home Tamoxifen. DVT Prophylaxis  DVT mechanoprophylaxis is achieved with SCDs due to brain mass.     Signed By: Alida Peres DO     August 2, 2022 Essential hypertension

## 2022-12-30 NOTE — ED ADULT TRIAGE NOTE - PRO INTERPRETER NEED 2
I'd like an evisit so I can discuss blood pressure/medications.  Greatly lower sodium in diet. Get up slowly and avoid rapid head movements and focus on taking deep breaths/meditation. Continue monitor blood pressure and if having chest pain or shortness of breath or stroke symptoms to er. Darrell Jones MD    English

## 2024-01-24 NOTE — PATIENT PROFILE ADULT - NSASFUNCLEVELADLTOILET_GEN_A_NUR
OSW placed outreach TC to the patient this afternoon. She states that her surgery went well. She shared that she is starting to feel more like herself again and she has begun the healing process. OSW expressed that I am glad everything went well for her. She expressed that she thought her post op appointment was today, however it is tomorrow. She stopped to visit her daughter for a while. She states that she is sitting in the rocking chair and her daughter is making her comfortable. She is going to return to work soon, however her plan is to retire soon. She has to think about how long she wants to continue to work before retiring.   Pt thanked this writer for checking in with her. OSW offered to place another outreach in a month and she was appreciative.  OSW will continue to follow.    0 = independent

## 2024-03-29 NOTE — PROGRESS NOTE ADULT - REASON FOR ADMISSION
Cough
83 year old female presenting for evaluation of multiple episodes of shaking of body just PTA. Patient states this is typical of her seizures. Also notes chest pain over last 1 month and chronic shortness of breath. Plan for cardiac monitor, labs, chest x-ray, reassess.
Cough

## 2025-06-24 NOTE — ED ADULT NURSE NOTE - CAS EDN DISCHARGE ASSESSMENT
[FreeTextEntry1] : 51 year old presents for postoperative visit s/p Laparoscopic right ovarian cystectomy, hysteroscopy, polypectomy, and dilation and curettage. She reports regular voiding and bowel movements, denies fevers/chills/dysuria.  Reports leg pain has ceased. Reports improvement in pelvic pain since surgery.    Alert and oriented to person, place and time